# Patient Record
Sex: MALE | Race: WHITE | NOT HISPANIC OR LATINO | Employment: UNEMPLOYED | URBAN - METROPOLITAN AREA
[De-identification: names, ages, dates, MRNs, and addresses within clinical notes are randomized per-mention and may not be internally consistent; named-entity substitution may affect disease eponyms.]

---

## 2019-11-20 ENCOUNTER — HOSPITAL ENCOUNTER (OUTPATIENT)
Facility: MEDICAL CENTER | Age: 49
End: 2019-11-22
Attending: EMERGENCY MEDICINE | Admitting: INTERNAL MEDICINE
Payer: COMMERCIAL

## 2019-11-20 ENCOUNTER — HOSPITAL ENCOUNTER (OUTPATIENT)
Facility: MEDICAL CENTER | Age: 49
End: 2019-11-20

## 2019-11-20 DIAGNOSIS — R79.89 ELEVATED TROPONIN: ICD-10-CM

## 2019-11-20 DIAGNOSIS — I25.9 CHEST PAIN DUE TO MYOCARDIAL ISCHEMIA, UNSPECIFIED ISCHEMIC CHEST PAIN TYPE: ICD-10-CM

## 2019-11-20 LAB
EKG IMPRESSION: NORMAL
TROPONIN T SERPL-MCNC: 32 NG/L (ref 6–19)

## 2019-11-20 PROCEDURE — 99285 EMERGENCY DEPT VISIT HI MDM: CPT

## 2019-11-20 PROCEDURE — 36415 COLL VENOUS BLD VENIPUNCTURE: CPT

## 2019-11-20 PROCEDURE — 84484 ASSAY OF TROPONIN QUANT: CPT

## 2019-11-20 PROCEDURE — 94760 N-INVAS EAR/PLS OXIMETRY 1: CPT

## 2019-11-20 PROCEDURE — 93005 ELECTROCARDIOGRAM TRACING: CPT

## 2019-11-20 PROCEDURE — 93005 ELECTROCARDIOGRAM TRACING: CPT | Performed by: EMERGENCY MEDICINE

## 2019-11-21 ENCOUNTER — APPOINTMENT (OUTPATIENT)
Dept: CARDIOLOGY | Facility: MEDICAL CENTER | Age: 49
End: 2019-11-21
Attending: STUDENT IN AN ORGANIZED HEALTH CARE EDUCATION/TRAINING PROGRAM
Payer: COMMERCIAL

## 2019-11-21 PROBLEM — R06.02 SHORTNESS OF BREATH: Status: ACTIVE | Noted: 2019-11-21

## 2019-11-21 PROBLEM — R07.9 CHEST PAIN: Status: ACTIVE | Noted: 2019-11-21

## 2019-11-21 PROBLEM — I50.9 CONGESTIVE HEART FAILURE (CHF) (HCC): Status: ACTIVE | Noted: 2019-11-21

## 2019-11-21 LAB
ALBUMIN SERPL BCP-MCNC: 4.1 G/DL (ref 3.2–4.9)
ALBUMIN/GLOB SERPL: 1.5 G/DL
ALP SERPL-CCNC: 110 U/L (ref 30–99)
ALT SERPL-CCNC: 22 U/L (ref 2–50)
AMPHET UR QL SCN: NEGATIVE
ANION GAP SERPL CALC-SCNC: 9 MMOL/L (ref 0–11.9)
AST SERPL-CCNC: 17 U/L (ref 12–45)
BARBITURATES UR QL SCN: NEGATIVE
BASOPHILS # BLD AUTO: 0.5 % (ref 0–1.8)
BASOPHILS # BLD: 0.05 K/UL (ref 0–0.12)
BENZODIAZ UR QL SCN: NEGATIVE
BILIRUB SERPL-MCNC: 0.5 MG/DL (ref 0.1–1.5)
BUN SERPL-MCNC: 15 MG/DL (ref 8–22)
BZE UR QL SCN: NEGATIVE
CALCIUM SERPL-MCNC: 8.8 MG/DL (ref 8.5–10.5)
CANNABINOIDS UR QL SCN: NEGATIVE
CHLORIDE SERPL-SCNC: 104 MMOL/L (ref 96–112)
CHOLEST SERPL-MCNC: 136 MG/DL (ref 100–199)
CO2 SERPL-SCNC: 24 MMOL/L (ref 20–33)
CREAT SERPL-MCNC: 0.98 MG/DL (ref 0.5–1.4)
EKG IMPRESSION: NORMAL
EKG IMPRESSION: NORMAL
EOSINOPHIL # BLD AUTO: 0.13 K/UL (ref 0–0.51)
EOSINOPHIL NFR BLD: 1.3 % (ref 0–6.9)
ERYTHROCYTE [DISTWIDTH] IN BLOOD BY AUTOMATED COUNT: 45.9 FL (ref 35.9–50)
GLOBULIN SER CALC-MCNC: 2.8 G/DL (ref 1.9–3.5)
GLUCOSE SERPL-MCNC: 127 MG/DL (ref 65–99)
HCT VFR BLD AUTO: 51.8 % (ref 42–52)
HDLC SERPL-MCNC: 42 MG/DL
HGB BLD-MCNC: 17.2 G/DL (ref 14–18)
IMM GRANULOCYTES # BLD AUTO: 0.05 K/UL (ref 0–0.11)
IMM GRANULOCYTES NFR BLD AUTO: 0.5 % (ref 0–0.9)
LDLC SERPL CALC-MCNC: 70 MG/DL
LV EJECT FRACT  99904: 20
LV EJECT FRACT MOD 2C 99903: 22.26
LV EJECT FRACT MOD 4C 99902: 29.19
LV EJECT FRACT MOD BP 99901: 26.2
LYMPHOCYTES # BLD AUTO: 3.05 K/UL (ref 1–4.8)
LYMPHOCYTES NFR BLD: 29.5 % (ref 22–41)
MAGNESIUM SERPL-MCNC: 1.8 MG/DL (ref 1.5–2.5)
MCH RBC QN AUTO: 30.2 PG (ref 27–33)
MCHC RBC AUTO-ENTMCNC: 33.2 G/DL (ref 33.7–35.3)
MCV RBC AUTO: 90.9 FL (ref 81.4–97.8)
METHADONE UR QL SCN: NEGATIVE
MONOCYTES # BLD AUTO: 0.78 K/UL (ref 0–0.85)
MONOCYTES NFR BLD AUTO: 7.5 % (ref 0–13.4)
NEUTROPHILS # BLD AUTO: 6.28 K/UL (ref 1.82–7.42)
NEUTROPHILS NFR BLD: 60.7 % (ref 44–72)
NRBC # BLD AUTO: 0 K/UL
NRBC BLD-RTO: 0 /100 WBC
OPIATES UR QL SCN: NEGATIVE
OXYCODONE UR QL SCN: POSITIVE
PCP UR QL SCN: NEGATIVE
PLATELET # BLD AUTO: 289 K/UL (ref 164–446)
PMV BLD AUTO: 9.7 FL (ref 9–12.9)
POTASSIUM SERPL-SCNC: 3.8 MMOL/L (ref 3.6–5.5)
PROPOXYPH UR QL SCN: NEGATIVE
PROT SERPL-MCNC: 6.9 G/DL (ref 6–8.2)
RBC # BLD AUTO: 5.7 M/UL (ref 4.7–6.1)
SODIUM SERPL-SCNC: 137 MMOL/L (ref 135–145)
TRIGL SERPL-MCNC: 122 MG/DL (ref 0–149)
TROPONIN T SERPL-MCNC: 33 NG/L (ref 6–19)
WBC # BLD AUTO: 10.3 K/UL (ref 4.8–10.8)

## 2019-11-21 PROCEDURE — 96372 THER/PROPH/DIAG INJ SC/IM: CPT

## 2019-11-21 PROCEDURE — 96366 THER/PROPH/DIAG IV INF ADDON: CPT

## 2019-11-21 PROCEDURE — 80053 COMPREHEN METABOLIC PANEL: CPT

## 2019-11-21 PROCEDURE — 93005 ELECTROCARDIOGRAM TRACING: CPT

## 2019-11-21 PROCEDURE — 80061 LIPID PANEL: CPT

## 2019-11-21 PROCEDURE — 700102 HCHG RX REV CODE 250 W/ 637 OVERRIDE(OP): Performed by: STUDENT IN AN ORGANIZED HEALTH CARE EDUCATION/TRAINING PROGRAM

## 2019-11-21 PROCEDURE — A9270 NON-COVERED ITEM OR SERVICE: HCPCS | Performed by: STUDENT IN AN ORGANIZED HEALTH CARE EDUCATION/TRAINING PROGRAM

## 2019-11-21 PROCEDURE — 36415 COLL VENOUS BLD VENIPUNCTURE: CPT

## 2019-11-21 PROCEDURE — 700111 HCHG RX REV CODE 636 W/ 250 OVERRIDE (IP): Performed by: STUDENT IN AN ORGANIZED HEALTH CARE EDUCATION/TRAINING PROGRAM

## 2019-11-21 PROCEDURE — 96375 TX/PRO/DX INJ NEW DRUG ADDON: CPT

## 2019-11-21 PROCEDURE — G0378 HOSPITAL OBSERVATION PER HR: HCPCS

## 2019-11-21 PROCEDURE — 85025 COMPLETE CBC W/AUTO DIFF WBC: CPT

## 2019-11-21 PROCEDURE — 700102 HCHG RX REV CODE 250 W/ 637 OVERRIDE(OP): Performed by: EMERGENCY MEDICINE

## 2019-11-21 PROCEDURE — 83735 ASSAY OF MAGNESIUM: CPT

## 2019-11-21 PROCEDURE — 700117 HCHG RX CONTRAST REV CODE 255: Performed by: STUDENT IN AN ORGANIZED HEALTH CARE EDUCATION/TRAINING PROGRAM

## 2019-11-21 PROCEDURE — 80307 DRUG TEST PRSMV CHEM ANLYZR: CPT

## 2019-11-21 PROCEDURE — 84484 ASSAY OF TROPONIN QUANT: CPT

## 2019-11-21 PROCEDURE — 93306 TTE W/DOPPLER COMPLETE: CPT

## 2019-11-21 PROCEDURE — 93005 ELECTROCARDIOGRAM TRACING: CPT | Performed by: STUDENT IN AN ORGANIZED HEALTH CARE EDUCATION/TRAINING PROGRAM

## 2019-11-21 PROCEDURE — 93306 TTE W/DOPPLER COMPLETE: CPT | Mod: 26 | Performed by: INTERNAL MEDICINE

## 2019-11-21 PROCEDURE — 93010 ELECTROCARDIOGRAM REPORT: CPT | Performed by: INTERNAL MEDICINE

## 2019-11-21 PROCEDURE — 99220 PR INITIAL OBSERVATION CARE,LEVL III: CPT | Mod: GC | Performed by: INTERNAL MEDICINE

## 2019-11-21 PROCEDURE — A9270 NON-COVERED ITEM OR SERVICE: HCPCS | Performed by: EMERGENCY MEDICINE

## 2019-11-21 PROCEDURE — 96365 THER/PROPH/DIAG IV INF INIT: CPT

## 2019-11-21 RX ORDER — GUAIFENESIN 600 MG/1
600 TABLET, EXTENDED RELEASE ORAL EVERY 12 HOURS
Status: DISCONTINUED | OUTPATIENT
Start: 2019-11-21 | End: 2019-11-21

## 2019-11-21 RX ORDER — LISINOPRIL 10 MG/1
10 TABLET ORAL
Status: DISCONTINUED | OUTPATIENT
Start: 2019-11-21 | End: 2019-11-22 | Stop reason: HOSPADM

## 2019-11-21 RX ORDER — CARVEDILOL 6.25 MG/1
6.25 TABLET ORAL 2 TIMES DAILY WITH MEALS
Status: ON HOLD | COMMUNITY
Start: 2018-12-17 | End: 2019-11-22

## 2019-11-21 RX ORDER — LISINOPRIL 10 MG/1
10 TABLET ORAL DAILY
COMMUNITY
Start: 2018-12-17 | End: 2019-12-10 | Stop reason: SDUPTHER

## 2019-11-21 RX ORDER — FUROSEMIDE 10 MG/ML
40 INJECTION INTRAMUSCULAR; INTRAVENOUS
Status: DISCONTINUED | OUTPATIENT
Start: 2019-11-22 | End: 2019-11-22 | Stop reason: HOSPADM

## 2019-11-21 RX ORDER — FUROSEMIDE 10 MG/ML
20 INJECTION INTRAMUSCULAR; INTRAVENOUS ONCE
Status: COMPLETED | OUTPATIENT
Start: 2019-11-21 | End: 2019-11-21

## 2019-11-21 RX ORDER — AMOXICILLIN 250 MG
2 CAPSULE ORAL 2 TIMES DAILY
Status: DISCONTINUED | OUTPATIENT
Start: 2019-11-21 | End: 2019-11-22 | Stop reason: HOSPADM

## 2019-11-21 RX ORDER — ASPIRIN 81 MG/1
81 TABLET, CHEWABLE ORAL DAILY
COMMUNITY
Start: 2018-09-25

## 2019-11-21 RX ORDER — SPIRONOLACTONE 25 MG/1
25 TABLET ORAL
Status: DISCONTINUED | OUTPATIENT
Start: 2019-11-21 | End: 2019-11-22 | Stop reason: HOSPADM

## 2019-11-21 RX ORDER — POTASSIUM CHLORIDE 20 MEQ/1
40 TABLET, EXTENDED RELEASE ORAL ONCE
Status: COMPLETED | OUTPATIENT
Start: 2019-11-21 | End: 2019-11-21

## 2019-11-21 RX ORDER — FUROSEMIDE 40 MG/1
40 TABLET ORAL
Status: DISCONTINUED | OUTPATIENT
Start: 2019-11-21 | End: 2019-11-21

## 2019-11-21 RX ORDER — ATORVASTATIN CALCIUM 80 MG/1
80 TABLET, FILM COATED ORAL EVERY EVENING
Status: ON HOLD | COMMUNITY
Start: 2018-12-17 | End: 2019-11-22

## 2019-11-21 RX ORDER — ATORVASTATIN CALCIUM 40 MG/1
40 TABLET, FILM COATED ORAL EVERY EVENING
Status: DISCONTINUED | OUTPATIENT
Start: 2019-11-21 | End: 2019-11-22 | Stop reason: HOSPADM

## 2019-11-21 RX ORDER — BISACODYL 10 MG
10 SUPPOSITORY, RECTAL RECTAL
Status: DISCONTINUED | OUTPATIENT
Start: 2019-11-21 | End: 2019-11-22 | Stop reason: HOSPADM

## 2019-11-21 RX ORDER — SPIRONOLACTONE 25 MG/1
25 TABLET ORAL DAILY
COMMUNITY
Start: 2018-12-17 | End: 2020-01-23 | Stop reason: SDUPTHER

## 2019-11-21 RX ORDER — POLYETHYLENE GLYCOL 3350 17 G/17G
1 POWDER, FOR SOLUTION ORAL
Status: DISCONTINUED | OUTPATIENT
Start: 2019-11-21 | End: 2019-11-22 | Stop reason: HOSPADM

## 2019-11-21 RX ORDER — CARVEDILOL 12.5 MG/1
12.5 TABLET ORAL 2 TIMES DAILY WITH MEALS
Status: DISCONTINUED | OUTPATIENT
Start: 2019-11-21 | End: 2019-11-22 | Stop reason: HOSPADM

## 2019-11-21 RX ORDER — POTASSIUM CHLORIDE 1500 MG/1
40 TABLET, EXTENDED RELEASE ORAL DAILY
Status: ON HOLD | COMMUNITY
Start: 2018-12-17 | End: 2019-11-22

## 2019-11-21 RX ORDER — MAGNESIUM SULFATE HEPTAHYDRATE 40 MG/ML
2 INJECTION, SOLUTION INTRAVENOUS ONCE
Status: COMPLETED | OUTPATIENT
Start: 2019-11-21 | End: 2019-11-21

## 2019-11-21 RX ORDER — CLOPIDOGREL BISULFATE 75 MG/1
75 TABLET ORAL DAILY
Status: ON HOLD | COMMUNITY
Start: 2018-12-17 | End: 2019-12-17

## 2019-11-21 RX ORDER — NICOTINE 21 MG/24HR
1 PATCH, TRANSDERMAL 24 HOURS TRANSDERMAL ONCE
Status: COMPLETED | OUTPATIENT
Start: 2019-11-21 | End: 2019-11-21

## 2019-11-21 RX ADMIN — CARVEDILOL 12.5 MG: 12.5 TABLET, FILM COATED ORAL at 17:58

## 2019-11-21 RX ADMIN — FUROSEMIDE 40 MG: 40 TABLET ORAL at 01:26

## 2019-11-21 RX ADMIN — NICOTINE 14 MG: 14 PATCH TRANSDERMAL at 01:26

## 2019-11-21 RX ADMIN — GUAIFENESIN 600 MG: 600 TABLET, EXTENDED RELEASE ORAL at 17:57

## 2019-11-21 RX ADMIN — CARVEDILOL 12.5 MG: 12.5 TABLET, FILM COATED ORAL at 08:03

## 2019-11-21 RX ADMIN — FUROSEMIDE 20 MG: 10 INJECTION, SOLUTION INTRAMUSCULAR; INTRAVENOUS at 12:26

## 2019-11-21 RX ADMIN — POTASSIUM CHLORIDE 40 MEQ: 1500 TABLET, EXTENDED RELEASE ORAL at 02:34

## 2019-11-21 RX ADMIN — ATORVASTATIN CALCIUM 40 MG: 40 TABLET, FILM COATED ORAL at 17:58

## 2019-11-21 RX ADMIN — SPIRONOLACTONE 25 MG: 25 TABLET ORAL at 05:03

## 2019-11-21 RX ADMIN — GUAIFENESIN 200 MG: 100 SOLUTION ORAL at 20:43

## 2019-11-21 RX ADMIN — GUAIFENESIN 600 MG: 600 TABLET, EXTENDED RELEASE ORAL at 08:02

## 2019-11-21 RX ADMIN — ENOXAPARIN SODIUM 40 MG: 100 INJECTION SUBCUTANEOUS at 05:03

## 2019-11-21 RX ADMIN — LISINOPRIL 10 MG: 10 TABLET ORAL at 05:03

## 2019-11-21 RX ADMIN — ATORVASTATIN CALCIUM 40 MG: 40 TABLET, FILM COATED ORAL at 02:34

## 2019-11-21 RX ADMIN — ASPIRIN 81 MG: 81 TABLET, COATED ORAL at 01:26

## 2019-11-21 RX ADMIN — MAGNESIUM SULFATE IN WATER 2 G: 40 INJECTION, SOLUTION INTRAVENOUS at 05:03

## 2019-11-21 RX ADMIN — HUMAN ALBUMIN MICROSPHERES AND PERFLUTREN 3 ML: 10; .22 INJECTION, SOLUTION INTRAVENOUS at 10:38

## 2019-11-21 ASSESSMENT — COGNITIVE AND FUNCTIONAL STATUS - GENERAL
SUGGESTED CMS G CODE MODIFIER MOBILITY: CH
DAILY ACTIVITIY SCORE: 24
MOBILITY SCORE: 24
SUGGESTED CMS G CODE MODIFIER DAILY ACTIVITY: CH

## 2019-11-21 ASSESSMENT — ENCOUNTER SYMPTOMS
TREMORS: 0
VOMITING: 0
NECK PAIN: 0
TINGLING: 0
DOUBLE VISION: 0
ORTHOPNEA: 1
PND: 1
WHEEZING: 0
PALPITATIONS: 0
BLURRED VISION: 0
COUGH: 1
ABDOMINAL PAIN: 0
WEIGHT LOSS: 0
DIZZINESS: 0
HEMOPTYSIS: 0
FEVER: 0
HEARTBURN: 0
DIARRHEA: 0
BRUISES/BLEEDS EASILY: 0
NAUSEA: 0
SPUTUM PRODUCTION: 0
HEADACHES: 0
PHOTOPHOBIA: 0
SHORTNESS OF BREATH: 1
CLAUDICATION: 0
MYALGIAS: 0
BACK PAIN: 0
CHILLS: 0
DEPRESSION: 0
HALLUCINATIONS: 0

## 2019-11-21 ASSESSMENT — LIFESTYLE VARIABLES
ALCOHOL_USE: NO
EVER HAD A DRINK FIRST THING IN THE MORNING TO STEADY YOUR NERVES TO GET RID OF A HANGOVER: NO
DOES PATIENT WANT TO STOP DRINKING: NO
TOTAL SCORE: 0
CONSUMPTION TOTAL: NEGATIVE
HAVE YOU EVER FELT YOU SHOULD CUT DOWN ON YOUR DRINKING: NO
EVER FELT BAD OR GUILTY ABOUT YOUR DRINKING: NO
SUBSTANCE_ABUSE: 0
HOW MANY TIMES IN THE PAST YEAR HAVE YOU HAD 5 OR MORE DRINKS IN A DAY: 0
TOTAL SCORE: 0
EVER_SMOKED: YES
HAVE PEOPLE ANNOYED YOU BY CRITICIZING YOUR DRINKING: NO
TOTAL SCORE: 0
AVERAGE NUMBER OF DAYS PER WEEK YOU HAVE A DRINK CONTAINING ALCOHOL: 0
ON A TYPICAL DAY WHEN YOU DRINK ALCOHOL HOW MANY DRINKS DO YOU HAVE: 0

## 2019-11-21 ASSESSMENT — COPD QUESTIONNAIRES
IN THE PAST 12 MONTHS DO YOU DO LESS THAN YOU USED TO BECAUSE OF YOUR BREATHING PROBLEMS: DISAGREE/UNSURE
DURING THE PAST 4 WEEKS HOW MUCH DID YOU FEEL SHORT OF BREATH: MOST  OR ALL OF THE TIME
HAVE YOU SMOKED AT LEAST 100 CIGARETTES IN YOUR ENTIRE LIFE: YES
COPD SCREENING SCORE: 5
DO YOU EVER COUGH UP ANY MUCUS OR PHLEGM?: NO/ONLY WITH OCCASIONAL COLDS OR INFECTIONS

## 2019-11-21 ASSESSMENT — PATIENT HEALTH QUESTIONNAIRE - PHQ9
1. LITTLE INTEREST OR PLEASURE IN DOING THINGS: NOT AT ALL
2. FEELING DOWN, DEPRESSED, IRRITABLE, OR HOPELESS: NOT AT ALL
SUM OF ALL RESPONSES TO PHQ9 QUESTIONS 1 AND 2: 0

## 2019-11-21 NOTE — CARE PLAN
Problem: Communication  Goal: The ability to communicate needs accurately and effectively will improve  Outcome: PROGRESSING AS EXPECTED     Problem: Safety  Goal: Will remain free from injury  Outcome: PROGRESSING AS EXPECTED  Goal: Will remain free from falls  Outcome: PROGRESSING AS EXPECTED     Problem: Infection  Goal: Will remain free from infection  Outcome: PROGRESSING AS EXPECTED     Problem: Venous Thromboembolism (VTW)/Deep Vein Thrombosis (DVT) Prevention:  Goal: Patient will participate in Venous Thrombosis (VTE)/Deep Vein Thrombosis (DVT)Prevention Measures  Outcome: PROGRESSING AS EXPECTED     Problem: Bowel/Gastric:  Goal: Normal bowel function is maintained or improved  Outcome: PROGRESSING AS EXPECTED  Goal: Will not experience complications related to bowel motility  Outcome: PROGRESSING AS EXPECTED     Problem: Knowledge Deficit  Goal: Knowledge of disease process/condition, treatment plan, diagnostic tests, and medications will improve  Outcome: PROGRESSING AS EXPECTED  Goal: Knowledge of the prescribed therapeutic regimen will improve  Outcome: PROGRESSING AS EXPECTED     Problem: Discharge Barriers/Planning  Goal: Patient's continuum of care needs will be met  Outcome: PROGRESSING AS EXPECTED     Problem: Fluid Volume:  Goal: Will maintain balanced intake and output  Outcome: PROGRESSING AS EXPECTED     Problem: Respiratory:  Goal: Respiratory status will improve  Outcome: PROGRESSING AS EXPECTED    No changes from previous assessment. Cath report from Graciela in bedside chart. MD aware. Plan for echo at this time/later today. No c/o of CP. VSS. Tele: GORDON

## 2019-11-21 NOTE — PROGRESS NOTES
Received report from flex RN. Assumed care at 0218. This pt is AOx4, ambulatory with SBA, reports no pain. Monitor room notified, SR. ABHIS

## 2019-11-21 NOTE — ASSESSMENT & PLAN NOTE
Prior history of CAD s/p PCI  Troponin T - 32 ->33  EKG shows T wave inversions in V4-V6 & AVL, similar to previous EKG findings.  - Telemetry  -Chest pain improved since hospitalization.

## 2019-11-21 NOTE — ED PROVIDER NOTES
"ED Provider Note    Scribed for Luis E Ballesteros M.D. by Sage Sage. 11/20/2019, 10:16 PM.    Primary care provider: Avi Harrell M.D. (Inactive)  Means of arrival: Ambulance  History obtained from: Patient and Transfer Report  History limited by: None    CHIEF COMPLAINT  Chief Complaint   Patient presents with   • Chest Pain   • Cough       HPI  Isiah Resendez is a 49 y.o. male with extensive cardiovascular history, including CHF and CAD, who [facility] pain, shortness of breath, and cough onset about one week ago. Patient reports that he was diagnosed with CHF and had stents placed in February of this year. Since then, the patient has been having worsening shortness of breath and has been hospitalized three more times in [place] since having the stents placed. He states that his shortness of breath is exacerbated seemingly random as sometimes he cannot breathe even when watching television at home. The patient reports [facility] because he has had three or four episodes of sharp chest pain over the last few days. He describes the pressure as a \"squeezing sensation\", and will usually go away if he relaxes. He also notes that he has been having a cold recently which has caused him to have a cough. This cough now exacerbates his shortness of breath to a point that causes him to be anxious, and he would be lightheaded after the coughing fit. [facility], but was eventually transferred here at [facility] due to EKG changes and for higher level of care. At presentation, the patient endorses having to sit up more frequently lately in order to breathe easier. He denies any lower extremity swelling as he is currently taking Lasix. Patient has no history of blood clots, and denies any alcohol or drug use.    REVIEW OF SYSTEMS  Pertinent positives include chest pain, shortness of breath, cough, " "lightheadedness, and anxious. Pertinent negatives include no lower extremity swelling. All other systems negative.    PAST MEDICAL HISTORY   has a past medical history of Chronic pain, Cold (1/1/15), HTN (hypertension), Renal disorder, and Urinary bladder disorder.    SURGICAL HISTORY   has a past surgical history that includes open reduction; other; other orthopedic surgery (2001 ); tonsillectomy and adenoidectomy; lithotripsy (2009); cystoscopy (1/13/2015); and lasertripsy (1/13/2015).    SOCIAL HISTORY  Social History     Tobacco Use   • Smoking status: Current Every Day Smoker     Packs/day: 1.00     Years: 25.00     Pack years: 25.00     Types: Cigarettes   • Smokeless tobacco: Never Used   Substance Use Topics   • Alcohol use: Yes     Comment: 1-2 beers/month   • Drug use: No     Comment: experimented drugs in [place]      Social History     Substance and Sexual Activity   Drug Use No    Comment: experimented drugs in [place]       FAMILY HISTORY  Family History   Problem Relation Age of Onset   • Other Mother         post polio   • Hypertension Mother    • Hyperlipidemia Mother    • Hyperlipidemia Father        CURRENT MEDICATIONS  Home Medications     Reviewed by María Shannon R.N. (Registered Nurse) on 11/20/19 at 2128  Med List Status: Partial   Medication Last Dose Status   lisinopril (PRINIVIL, ZESTRIL) 40 MG tablet  Active                ALLERGIES  No Known Allergies    PHYSICAL EXAM  VITAL SIGNS: /103   Pulse 80   Temp 36.8 °C (98.2 °F) (Temporal)   Resp 19   Ht 1.88 m (6' 2\")   Wt 88 kg (194 lb)   SpO2 94%   BMI 24.91 kg/m²     Constitutional: Well developed, Well nourished, mild distress.   HENT: Normocephalic, Atraumatic, Oropharynx moist.   Eyes: Conjunctiva normal, No discharge.   Neck: Supple, No stridor   Cardiovascular: Normal heart rate, Normal rhythm, No murmurs, equal pulses.   Pulmonary: Normal breath sounds, No respiratory distress, No wheezing, No rales, No rhonchi.  Chest: No " chest wall tenderness or deformity.   Abdomen: Obese. Soft, No tenderness, No masses, no rebound, no guarding.   Back: No CVA tenderness.   Musculoskeletal: No major deformities noted, No tenderness. No edema in lower extremities. No calf tenderness.  Skin: Warm, Dry, No erythema, No rash.   Neurologic: Alert & oriented x 3, Normal motor function,  No focal deficits noted.   Psychiatric: Affect normal, Judgment normal, Mood normal.     LABS  Results for orders placed or performed during the hospital encounter of 11/20/19   Troponin STAT   Result Value Ref Range    Troponin T 32 (H) 6 - 19 ng/L   Comp Metabolic Panel   Result Value Ref Range    Sodium 137 135 - 145 mmol/L    Potassium 3.8 3.6 - 5.5 mmol/L    Chloride 104 96 - 112 mmol/L    Co2 24 20 - 33 mmol/L    Anion Gap 9.0 0.0 - 11.9    Glucose 127 (H) 65 - 99 mg/dL    Bun 15 8 - 22 mg/dL    Creatinine 0.98 0.50 - 1.40 mg/dL    Calcium 8.8 8.5 - 10.5 mg/dL    AST(SGOT) 17 12 - 45 U/L    ALT(SGPT) 22 2 - 50 U/L    Alkaline Phosphatase 110 (H) 30 - 99 U/L    Total Bilirubin 0.5 0.1 - 1.5 mg/dL    Albumin 4.1 3.2 - 4.9 g/dL    Total Protein 6.9 6.0 - 8.2 g/dL    Globulin 2.8 1.9 - 3.5 g/dL    A-G Ratio 1.5 g/dL   CBC WITH DIFFERENTIAL   Result Value Ref Range    WBC 10.3 4.8 - 10.8 K/uL    RBC 5.70 4.70 - 6.10 M/uL    Hemoglobin 17.2 14.0 - 18.0 g/dL    Hematocrit 51.8 42.0 - 52.0 %    MCV 90.9 81.4 - 97.8 fL    MCH 30.2 27.0 - 33.0 pg    MCHC 33.2 (L) 33.7 - 35.3 g/dL    RDW 45.9 35.9 - 50.0 fL    Platelet Count 289 164 - 446 K/uL    MPV 9.7 9.0 - 12.9 fL    Neutrophils-Polys 60.70 44.00 - 72.00 %    Lymphocytes 29.50 22.00 - 41.00 %    Monocytes 7.50 0.00 - 13.40 %    Eosinophils 1.30 0.00 - 6.90 %    Basophils 0.50 0.00 - 1.80 %    Immature Granulocytes 0.50 0.00 - 0.90 %    Nucleated RBC 0.00 /100 WBC    Neutrophils (Absolute) 6.28 1.82 - 7.42 K/uL    Lymphs (Absolute) 3.05 1.00 - 4.80 K/uL    Monos (Absolute) 0.78 0.00 - 0.85 K/uL    Eos (Absolute) 0.13 0.00 -  0.51 K/uL    Baso (Absolute) 0.05 0.00 - 0.12 K/uL    Immature Granulocytes (abs) 0.05 0.00 - 0.11 K/uL    NRBC (Absolute) 0.00 K/uL   MAGNESIUM   Result Value Ref Range    Magnesium 1.8 1.5 - 2.5 mg/dL   ESTIMATED GFR   Result Value Ref Range    GFR If African American >60 >60 mL/min/1.73 m 2    GFR If Non African American >60 >60 mL/min/1.73 m 2   EKG   Result Value Ref Range    Report       [facility] Emergency Dept.    Test Date:  2019  Pt Name:    LINNEA WILLINGHAM                 Department: ER  MRN:        [id number]                      Room:       RD 01  Gender:     Male                         Technician: [id number]  :        1970                   Requested By:ER TRIAGE PROTOCOL  Order #:    [id number]                    Reading MD: GINA GARCIA MD    Measurements  Intervals                                Axis  Rate:       80                           P:          7  CO:         176                          QRS:        -73  QRSD:       98                           T:          102  QT:         424  QTc:        490    Interpretive Statements  SINUS RHYTHM  PROBABLE LEFT ATRIAL ABNORMALITY  PROBABLE INFERIOR INFARCT, OLD  LATERAL LEADS ARE ALSO INVOLVED  BORDERLINE PROLONGED QT INTERVAL  No previous ECG available for comparison  Compared to outside EKG, St depression less.  Electronically Signed On 2019 23:39:47 PST by GINA ONEAL MD       All labs reviewed by me.    EKG  12 Lead EKG interpreted by me as shown above.    COURSE & MEDICAL DECISION MAKING  Pertinent Labs & Imaging studies reviewed. (See chart for details)    10:05 PM - Obtained and reviewed past medical records which indicated the patient had CHF and CAD with stenting in February of this year. He also had an ejection fraction of 25% then. [facility] Nitrogen of 15, Creatinine of 1.0, Alkaline Phosphatase 114, AST 15, T. Bilirubin 0.3, and Magnesium 1.8. Initial  troponin taken there was 0.07 and INR of 1.2. Patient had sodium levels of 141, Potassium of 3.6, Chloride 105, CO2 of 25, and Glucose 130. His Rapid Flu A was negative. Other lab findings showed B Type Natriuretic Peptide of 415, WBC 9.83, HGB 18.1, HCG 53.1, and PLT of 317. Chest x-ray show subsegmental atelectasis versus scar in the lower left lung. The cardiac silhouette is normal. There is unchanged tortuosity of the thoracic aorta. The pulmonary vascular is normal. There is no plural effusion. There is no pneumothorax.     9:25 PM - Ordered EKG to evaluate.    10:16 PM - Patient seen and examined at bedside. Ordered Troponin STAT and EKG to evaluate his symptoms. Discussed with the patient my plan to order repeat troponin and hospitalize him for further care and evaluation. Patient agrees to hospitalization and plan of care. The differential diagnoses include but are not limited to: ACS, Myocardial infarction, NSTEMI, and CHF.     11:34 PM - I discussed the patient's case and the above findings with Dr. Mosqueda ([facility]) who will evaluate the patient for hospitalization.      Medical Decision Making: At this point time patient does have an elevated troponin with chest pain and history of significant coronary artery disease therefore I think he would benefit from further risk stratification with repeat enzymes and possible stress test.  Patient's lung exam is otherwise clear I do not think this is CHF he does not look volume overloaded.  Pain is not ripping or tearing does not go to his back I do not think this is aortic dissection.  Patient is not hypoxic no signs of DVT I do not think a d-dimer would be helpful.    DISPOSITION:  Patient will be hospitalized by Dr. Mosqueda ([facility] [place] [facility]) in guarded condition.     FINAL IMPRESSION  1. Chest pain due to myocardial ischemia, unspecified ischemic chest pain type    2. Elevated troponin          I, Sage Sage (Scribe), am scribing for, and in the  presence of, Luis E Ballesteros M.D.    Electronically signed by: Sage Sage (Scribe), 11/20/2019    ILuis E M.D. personally performed the services described in this documentation, as scribed by Sage Sage in my presence, and it is both accurate and complete.    C    The note accurately reflects work and decisions made by me.  Luis E Ballesteros  11/21/2019  1:38 AM

## 2019-11-21 NOTE — SENIOR ADMIT NOTE
Senior Admit Note      This is a 49 years old male, recently moved from [place], his past medical is history significant for CAD s/p PCI x 3 [place] to dissected RCA (2/2018) at [facility] (was following Dr. Thomas - now wants to establish care in [facility], hasn't seen anyone yet), Ischaemic cardiomyopathy (EF improved from 15% to 30 % in a month after MI, then repeat echo in 05/2018 was 59% - didn't get ICD), h/o Meth abuse - [facility] Acute Coronary syndrome.       Patient has been having chest pain for last 2 days, intermittent, starts as a sharp pain then feels like pressure (someone sitting on his chest), not exertional pattern but relieves with taking rest/relaxing, no radiation, each episode lasts few minutes, not associated with nausea vomiting/sweating.  He is also having progressively worsening shortness of breath, orthopnea, PND for the last few weeks.  Especially for last couple of days he is having ' coughing fits' without any productive sputum followed by shortness of breath.  Denies any fever, chills, palpitation, leg swelling, weight gain.      Labs from ER significant for troponin I 0.07, creatinine 1.0, . CXR showed mild left lower base atelectasis. He was transferred to [facility] for Elevated troponin and EKG changes (I couldn't appreciate any acute ischemic changes). Troponin T in [facility] ER is 32, EKG shows T wave inversion in V4-6 and AVL, which is similar to his EKG from 10//16/[facility].       Physical Exam   Constitutional:   Elderly male lying comfortably in bed, in no acute distress, able to complete full sentence   Neck: No JVD present.   Cardiovascular: Normal rate, regular rhythm, normal heart sounds and intact distal pulses.   Pulmonary/Chest: Effort normal and breath sounds normal. No respiratory distress. He has no wheezes. He has no rales. He exhibits no  tenderness.   Musculoskeletal:         General: No edema.         Assessment and Plan:  Stable Angina:  CHF:  CAD:  Extensive cardiac history, comes with chest pain and worsening SOB. On physical exam euvolumic. Hemodynamically stable. On RA.   - EKG findings of lateral ischemia which is stable when compared with prior, 2 troponin ([facility]) has been insignificant for ACS , will trend 1 more time.  - If neg, stress test in the AM, NPO at midnight    - I don't think he is in acute exacerbation of heart failure,  in [facility]. But given his chronic progressing symptoms his heart function might have worsened. Day team to consider echocardiogram in the AM  - Resumed all home meds including PO Lasix and aldactone, Aspirin was not on [facility] med list. Added 81 mg daily   - Replete K and Mg, goal K >4, Mg >2    For full H&P see Dr. Cool's note

## 2019-11-21 NOTE — ED TRIAGE NOTES
[facility]. Pt presented with complaints of cough since Tuesday or Wednesday with the addition of CP over the last few days. [facility] stated that chest pain had resolved. [facility] obtained [facility] and noted possible T wave depression, Troponin resulted at 0.6. Pt has hx of hypertension CHF, and cardiomyopathy. Pt states he is compliant with his medications. Pt is still a current everyday smoker. Pt denies chest pain at this time but reports SOB with activity

## 2019-11-21 NOTE — PROGRESS NOTES
Received report from ED RN. Pt arrived to unit at 0215 via gurney escorted by ACLS RN. Assessment complete. VSS. No signs of distress noted at this time. Tele monitor in place. Monitor room notified. Pt c/o pain 0/10. Fall precautions and appropriate signs in place. Pt oriented to unit routine, call light/phone system within reach. Personal belongings within reach. RN extension number provided. Pt educated regarding fall precautions. Bed alarm is on. No isolation precautions in place. Pt denies any further needs at this time.

## 2019-11-21 NOTE — ED PROVIDER NOTES
Realease of info faxed to [facility] in [facility] per MD regarding previous cardiac catheterization report.

## 2019-11-21 NOTE — PROGRESS NOTES
2 RN skin assessment complete with Manda RN    Feet dry and flaky bilateral  No other areas of concern noted at this time skin clean dry and intact

## 2019-11-21 NOTE — PROGRESS NOTES
Pt up to bathroom this morning. Pt UAL, tolerates well. Pt with no complaints of CP. Plan for echo possible stress test. NPO since MN. VSS on RA. Tele: GORDON

## 2019-11-21 NOTE — CARE PLAN
Problem: Communication  Goal: The ability to communicate needs accurately and effectively will improve  Outcome: PROGRESSING AS EXPECTED   Patient educated to utilize call light. Patient oriented to hospital room. Patient encouraged to ask questions about plan of care. Patient effectively uses call light and is involved in POC.     Problem: Safety  Goal: Will remain free from injury  Outcome: PROGRESSING AS EXPECTED   Patient's risk for injury and falls assessed. Appropriate safety precautions in place. Patient educated to utilize call light for needs. Patient verbalizes understanding.

## 2019-11-21 NOTE — ASSESSMENT & PLAN NOTE
Improved but still orthopnea present  -H/O ischemic cardiomyopathy  -Likely due to acute exacerbation of congestive heart failure  BNP of 415 in [facility]  No JVD, but mild pitting edema  - Pulse oximetry  - I&O  - Resume home meds(aldactone, coreg, lasix)  - Correct K and Mg as needed

## 2019-11-21 NOTE — H&P
Internal Medicine Admitting History and Physical    Note Author: Amira Cool M.D.       Name Isiah Resendez     1970   Age/Sex 49 y.o. male   MRN [id number]   Code Status Full Code     After 5PM or if no immediate response to page, please call for cross-coverage  Attending/Team:    See Patient List for primary contact information  Call [phone] to page    1st Call - Day Intern (R1):   Dr. Tamayo 2nd Call - Day Sr. Resident (R2/R3):          Chief Complaint:   Chest Pain/SOB    HPI:  Pt. refused to provide history when met in person. As per history elicited by :    Isiah is 50 y/o Male with PMHx of CAD s/p PCI with 3*WILBERTO to RCA(2018), Ischemic Cardiomyopathy,Meth abuse, HTN is [facility].    Reports having chest pain for the previous 2 days, sharp type, intermittent, lasting for few minutes which later feels like pressure, not associated with exertion and relieved with rest. Denies radiation, palpaitations, sweating.    Shortness of breath for the past several weeks associated with orthopnea, PND. Reports having several bouts of coughing followed by shortness of breath. Denies associated leg swelling.    [facility]abs from [facility]- Troponin 0.6, EKG - T wave depression. He was transferred to [facility] due to elevated troponin and EKG changes.    Labs in [facility]:  Troponin T-32 -> 33  EKG:   SINUS RHYTHM   PROBABLE LEFT ATRIAL ABNORMALITY   PROBABLE INFERIOR INFARCT, OLD   LATERAL LEADS ARE ALSO INVOLVED   BORDERLINE PROLONGED QT INTERVAL  UDS: Positive for oxycodone      Review of Systems   Constitutional: Negative for chills, fever and weight loss.   HENT: Negative for ear discharge, ear pain, hearing loss and tinnitus.    Eyes: Negative for blurred vision, double vision and photophobia.   Respiratory: Positive for cough and shortness of breath. Negative for hemoptysis, sputum production and wheezing.     Cardiovascular: Positive for chest pain, orthopnea and PND. Negative for palpitations, claudication and leg swelling.   Gastrointestinal: Negative for abdominal pain, diarrhea, nausea and vomiting.   Genitourinary: Negative for dysuria, frequency and urgency.   Musculoskeletal: Negative for back pain, myalgias and neck pain.   Skin: Negative for itching and rash.   Neurological: Negative for dizziness, tingling, tremors and headaches.   Psychiatric/Behavioral: Negative for depression, hallucinations, substance abuse and suicidal ideas.             Past Medical History (Chronic medical problem, known complications and current treatment)    HTN,Nephrolithiasis,CAD s/p stenting    Past Surgical History:  Past Surgical History:   Procedure Laterality Date   • CYSTOSCOPY  1/13/2015    Performed by Eugenio Vargas M.D. at SURGERY [facility] ORS   • LASERTRIPSY  1/13/2015    Performed by Eugenio Vargas M.D. at SURGERY [facility] ORS   • LITHOTRIPSY  2009    kidney stones   • OTHER ORTHOPEDIC SURGERY  2001     R tibial plateu fracture   • OPEN REDUCTION     • OTHER      ORIF R elbow   • TONSILLECTOMY AND ADENOIDECTOMY         Current Outpatient Medications:  Home Medications     Reviewed by Manda Franco R.N. (Registered Nurse) on 11/21/19 at 0339  Med List Status: Partial   Medication Last Dose Status   aspirin (ASA) 81 MG Chew Tab chewable tablet 11/21/2019 Active   atorvastatin (LIPITOR) 80 MG tablet 11/21/2019 Active   carvedilol (COREG) 6.25 MG Tab 11/20/2019 Active   clopidogrel (PLAVIX) 75 MG Tab 11/20/2019 Active   lisinopril (PRINIVIL) 10 MG Tab 11/20/2019 Active   lisinopril (PRINIVIL, ZESTRIL) 40 MG tablet 11/20/2019 Active   potassium Chloride ER (K-TAB) 20 MEQ Tab CR tablet 11/21/2019 Active   spironolactone (ALDACTONE) 25 MG Tab 11/20/2019 Active                Medication Allergy/Sensitivities:  No Known Allergies      Family History (mandatory)   Family History   Problem Relation Age of Onset    • Other Mother         post polio   • Hypertension Mother    • Hyperlipidemia Mother    • Hyperlipidemia Father        Social History (mandatory)   Social History     Socioeconomic History   • Marital status: Single     Spouse name: Not on file   • Number of children: Not on file   • Years of education: Not on file   • Highest education level: Not on file   Occupational History   • Occupation:      Employer: [employer]   Social Needs   • Financial resource strain: Not on file   • Food insecurity:     Worry: Not on file     Inability: Not on file   • Transportation needs:     Medical: Not on file     Non-medical: Not on file   Tobacco Use   • Smoking status: Current Every Day Smoker     Packs/day: 1.00     Years: 25.00     Pack years: 25.00     Types: Cigarettes   • Smokeless tobacco: Never Used   Substance and Sexual Activity   • Alcohol use: Yes     Comment: 1-2 beers/month   • Drug use: No     Comment: experimented drugs in [place]   • Sexual activity: Yes     Partners: Female   Lifestyle   • Physical activity:     Days per week: Not on file     Minutes per session: Not on file   • Stress: Not on file   Relationships   • Social connections:     Talks on phone: Not on file     Gets together: Not on file     Attends Faith service: Not on file     Active member of club or organization: Not on file     Attends meetings of clubs or organizations: Not on file     Relationship status: Not on file   • Intimate partner violence:     Fear of current or ex partner: Not on file     Emotionally abused: Not on file     Physically abused: Not on file     Forced sexual activity: Not on file   Other Topics Concern   • Not on file   Social History Narrative   • Not on file     Living situation: Home  PCP : Avi Harrell M.D. (Inactive)    Physical Exam     Vitals:    11/21/19 0031 11/21/19 0101 11/21/19 0131 11/21/19 0221   BP: 129/88 135/91 149/99 150/106   Pulse: 80  94 91   Resp:    17   Temp:    36.3 °C  (97.3 °F)   TempSrc:    Temporal   SpO2:   93% 93%   Weight:    (!) 133 kg (293 lb 3.4 oz)   Height:         Body mass index is 37.65 kg/m².  O2 therapy: Pulse Oximetry: 93 %, O2 Delivery: None (Room Air)    Physical Exam   Constitutional: He is oriented to person, place, and time and well-developed, well-nourished, and in no distress.   HENT:   Head: Normocephalic and atraumatic.   Eyes: Pupils are equal, round, and reactive to light. Conjunctivae are normal.   Neck: Neck supple. No JVD present.   Cardiovascular: Normal rate, regular rhythm and normal heart sounds.   Pulmonary/Chest: Effort normal and breath sounds normal. No respiratory distress. He has no wheezes. He has no rales.   Abdominal: Soft. Bowel sounds are normal. He exhibits no distension. There is no tenderness. There is no rebound.   Musculoskeletal:         General: No tenderness or edema.   Neurological: He is alert and oriented to person, place, and time.         Data Review       Old Records Request:   Completed  Current Records review/summary: Completed    Lab Data Review:  Recent Results (from the past 24 hour(s))   EKG    Collection Time: 19  9:28 PM   Result Value Ref Range    Report       [facility] Emergency Dept.    Test Date:  2019  Pt Name:    LINNEA WILLINGHAM                 Department: ER  MRN:        [id number]                      Room:       [facility]  Gender:     Male                         Technician: [id number]  :        1970                   Requested By:ER TRIAGE PROTOCOL  Order #:    [id number]                    Reading MD: GINA GARCIA MD    Measurements  Intervals                                Axis  Rate:       80                           P:          7  LA:         176                          QRS:        -73  QRSD:       98                           T:          102  QT:         424  QTc:        490    Interpretive Statements  SINUS RHYTHM  PROBABLE LEFT ATRIAL ABNORMALITY  PROBABLE  INFERIOR INFARCT, OLD  LATERAL LEADS ARE ALSO INVOLVED  BORDERLINE PROLONGED QT INTERVAL  No previous ECG available for comparison  Compared to outside EKG, St depression less.  Electronically Signed On 11- 23:39:47 PST by GINA ONEAL MD     Troponin STAT    Collection Time: 11/20/19  9:30 PM   Result Value Ref Range    Troponin T 32 (H) 6 - 19 ng/L   TROPONIN    Collection Time: 11/21/19 12:54 AM   Result Value Ref Range    Troponin T 33 (H) 6 - 19 ng/L   Comp Metabolic Panel    Collection Time: 11/21/19 12:54 AM   Result Value Ref Range    Sodium 137 135 - 145 mmol/L    Potassium 3.8 3.6 - 5.5 mmol/L    Chloride 104 96 - 112 mmol/L    Co2 24 20 - 33 mmol/L    Anion Gap 9.0 0.0 - 11.9    Glucose 127 (H) 65 - 99 mg/dL    Bun 15 8 - 22 mg/dL    Creatinine 0.98 0.50 - 1.40 mg/dL    Calcium 8.8 8.5 - 10.5 mg/dL    AST(SGOT) 17 12 - 45 U/L    ALT(SGPT) 22 2 - 50 U/L    Alkaline Phosphatase 110 (H) 30 - 99 U/L    Total Bilirubin 0.5 0.1 - 1.5 mg/dL    Albumin 4.1 3.2 - 4.9 g/dL    Total Protein 6.9 6.0 - 8.2 g/dL    Globulin 2.8 1.9 - 3.5 g/dL    A-G Ratio 1.5 g/dL   CBC WITH DIFFERENTIAL    Collection Time: 11/21/19 12:54 AM   Result Value Ref Range    WBC 10.3 4.8 - 10.8 K/uL    RBC 5.70 4.70 - 6.10 M/uL    Hemoglobin 17.2 14.0 - 18.0 g/dL    Hematocrit 51.8 42.0 - 52.0 %    MCV 90.9 81.4 - 97.8 fL    MCH 30.2 27.0 - 33.0 pg    MCHC 33.2 (L) 33.7 - 35.3 g/dL    RDW 45.9 35.9 - 50.0 fL    Platelet Count 289 164 - 446 K/uL    MPV 9.7 9.0 - 12.9 fL    Neutrophils-Polys 60.70 44.00 - 72.00 %    Lymphocytes 29.50 22.00 - 41.00 %    Monocytes 7.50 0.00 - 13.40 %    Eosinophils 1.30 0.00 - 6.90 %    Basophils 0.50 0.00 - 1.80 %    Immature Granulocytes 0.50 0.00 - 0.90 %    Nucleated RBC 0.00 /100 WBC    Neutrophils (Absolute) 6.28 1.82 - 7.42 K/uL    Lymphs (Absolute) 3.05 1.00 - 4.80 K/uL    Monos (Absolute) 0.78 0.00 - 0.85 K/uL    Eos (Absolute) 0.13 0.00 - 0.51 K/uL    Baso (Absolute) 0.05 0.00 - 0.12  K/uL    Immature Granulocytes (abs) 0.05 0.00 - 0.11 K/uL    NRBC (Absolute) 0.00 K/uL   MAGNESIUM    Collection Time: 19 12:54 AM   Result Value Ref Range    Magnesium 1.8 1.5 - 2.5 mg/dL   ESTIMATED GFR    Collection Time: 19 12:54 AM   Result Value Ref Range    GFR If African American >60 >60 mL/min/1.73 m 2    GFR If Non African American >60 >60 mL/min/1.73 m 2   URINE DRUG SCREEN    Collection Time: 19  2:40 AM   Result Value Ref Range    Amphetamines Urine Negative Negative    Barbiturates Negative Negative    Benzodiazepines Negative Negative    Cocaine Metabolite Negative Negative    Methadone Negative Negative    Opiates Negative Negative    Oxycodone Positive (A) Negative    Phencyclidine -Pcp Negative Negative    Propoxyphene Negative Negative    Cannabinoid Metab Negative Negative   EKG    Collection Time: 19  4:34 AM   Result Value Ref Range    Report       [facility]    Test Date:  2019  Pt Name:    LINNEA WILLINGHAM                 Department: ER  MRN:        [id number]                      Room:       [facility]  Gender:     Male                         Technician: RONALD  :        1970                   Requested By:JACI BERMUDEZ  Order #:    [id number]                    Reading MD:    Measurements  Intervals                                Axis  Rate:       90                           P:          44  DC:         176                          QRS:        248  QRSD:       104                          T:          92  QT:         412  QTc:        504    Interpretive Statements  SINUS RHYTHM  PROBABLE LEFT ATRIAL ABNORMALITY  RIGHT AXIS DEVIATION  INFERIOR INFARCT, OLD  LATERAL LEADS ARE ALSO INVOLVED  PROLONGED QT INTERVAL  Compared to ECG 2019 21:28:47  Right-axis deviation now present  Myocardial infarct finding still present         Imaging/Procedures Review:    Independant Imaging Review: Completed  No orders to display            EKG:   EKG Independent Review:  Completed  QTc:504, HR: 90, Normal Sinus Rhythm, T wave inversions in V4-V6&AVL    Records reviewed and summarized in current documentation :  Yes  [employer] teaching service handout given to patient:  Yes         Assessment/Plan     Chest pain  Assessment & Plan  Prior history of CAD s/p PCI  Troponin T - 32 ->33  EKG shows T wave inversions in V4-V6 & AVL, similar to previous EKG findings.  Plan:  - Day team to discuss regarding the need for stress test  - NPO at midnight        Shortness of breath  Assessment & Plan  H/O ischemic cardiomyopathy  Does not appear to be in acute exacerbation, saturating well on room air  BNP of 415 [facility]  Chronic progressive symptoms with no signs of fluid overload  No elevated JVD, pedal edema  Symptoms likely secondary to deteriorated heart function  Plan:  - Pulse oximetry  - I&O  - Day team to decide on getting ECHO  - Resume home meds(aldactone, coreg, lasix)  - Correct K and Mg as needed          Anticipated Hospital stay: Observation admit        Quality Measures  Quality-Core Measures   Reviewed items::  EKG reviewed, Labs reviewed, Medications reviewed and Radiology images reviewed  DVT prophylaxis pharmacological::  Enoxaparin (Lovenox)    PCP: Avi Harrell M.D. (Inactive)

## 2019-11-22 ENCOUNTER — PATIENT OUTREACH (OUTPATIENT)
Dept: HEALTH INFORMATION MANAGEMENT | Facility: OTHER | Age: 49
End: 2019-11-22

## 2019-11-22 VITALS
HEART RATE: 74 BPM | SYSTOLIC BLOOD PRESSURE: 98 MMHG | WEIGHT: 293.87 LBS | BODY MASS INDEX: 37.72 KG/M2 | RESPIRATION RATE: 17 BRPM | DIASTOLIC BLOOD PRESSURE: 73 MMHG | TEMPERATURE: 96.9 F | OXYGEN SATURATION: 96 % | HEIGHT: 74 IN

## 2019-11-22 LAB
ALBUMIN SERPL BCP-MCNC: 4.6 G/DL (ref 3.2–4.9)
ALBUMIN/GLOB SERPL: 1.8 G/DL
ALP SERPL-CCNC: 128 U/L (ref 30–99)
ALT SERPL-CCNC: 26 U/L (ref 2–50)
ANION GAP SERPL CALC-SCNC: 13 MMOL/L (ref 0–11.9)
AST SERPL-CCNC: 20 U/L (ref 12–45)
BASOPHILS # BLD AUTO: 0.5 % (ref 0–1.8)
BASOPHILS # BLD: 0.06 K/UL (ref 0–0.12)
BILIRUB SERPL-MCNC: 0.6 MG/DL (ref 0.1–1.5)
BUN SERPL-MCNC: 18 MG/DL (ref 8–22)
CALCIUM SERPL-MCNC: 9.4 MG/DL (ref 8.5–10.5)
CHLORIDE SERPL-SCNC: 104 MMOL/L (ref 96–112)
CO2 SERPL-SCNC: 22 MMOL/L (ref 20–33)
CREAT SERPL-MCNC: 1.11 MG/DL (ref 0.5–1.4)
EOSINOPHIL # BLD AUTO: 0.14 K/UL (ref 0–0.51)
EOSINOPHIL NFR BLD: 1.3 % (ref 0–6.9)
ERYTHROCYTE [DISTWIDTH] IN BLOOD BY AUTOMATED COUNT: 45.3 FL (ref 35.9–50)
GLOBULIN SER CALC-MCNC: 2.6 G/DL (ref 1.9–3.5)
GLUCOSE SERPL-MCNC: 121 MG/DL (ref 65–99)
HCT VFR BLD AUTO: 56.1 % (ref 42–52)
HGB BLD-MCNC: 18.9 G/DL (ref 14–18)
IMM GRANULOCYTES # BLD AUTO: 0.06 K/UL (ref 0–0.11)
IMM GRANULOCYTES NFR BLD AUTO: 0.5 % (ref 0–0.9)
LYMPHOCYTES # BLD AUTO: 2.56 K/UL (ref 1–4.8)
LYMPHOCYTES NFR BLD: 23.3 % (ref 22–41)
MCH RBC QN AUTO: 30.4 PG (ref 27–33)
MCHC RBC AUTO-ENTMCNC: 33.7 G/DL (ref 33.7–35.3)
MCV RBC AUTO: 90.2 FL (ref 81.4–97.8)
MONOCYTES # BLD AUTO: 0.79 K/UL (ref 0–0.85)
MONOCYTES NFR BLD AUTO: 7.2 % (ref 0–13.4)
NEUTROPHILS # BLD AUTO: 7.37 K/UL (ref 1.82–7.42)
NEUTROPHILS NFR BLD: 67.2 % (ref 44–72)
NRBC # BLD AUTO: 0 K/UL
NRBC BLD-RTO: 0 /100 WBC
PLATELET # BLD AUTO: 316 K/UL (ref 164–446)
PMV BLD AUTO: 9.9 FL (ref 9–12.9)
POTASSIUM SERPL-SCNC: 4.2 MMOL/L (ref 3.6–5.5)
PROT SERPL-MCNC: 7.2 G/DL (ref 6–8.2)
RBC # BLD AUTO: 6.22 M/UL (ref 4.7–6.1)
SODIUM SERPL-SCNC: 139 MMOL/L (ref 135–145)
WBC # BLD AUTO: 11 K/UL (ref 4.8–10.8)

## 2019-11-22 PROCEDURE — A9270 NON-COVERED ITEM OR SERVICE: HCPCS | Performed by: STUDENT IN AN ORGANIZED HEALTH CARE EDUCATION/TRAINING PROGRAM

## 2019-11-22 PROCEDURE — 700111 HCHG RX REV CODE 636 W/ 250 OVERRIDE (IP): Performed by: STUDENT IN AN ORGANIZED HEALTH CARE EDUCATION/TRAINING PROGRAM

## 2019-11-22 PROCEDURE — 80053 COMPREHEN METABOLIC PANEL: CPT

## 2019-11-22 PROCEDURE — 36415 COLL VENOUS BLD VENIPUNCTURE: CPT

## 2019-11-22 PROCEDURE — 700102 HCHG RX REV CODE 250 W/ 637 OVERRIDE(OP): Performed by: STUDENT IN AN ORGANIZED HEALTH CARE EDUCATION/TRAINING PROGRAM

## 2019-11-22 PROCEDURE — 96376 TX/PRO/DX INJ SAME DRUG ADON: CPT

## 2019-11-22 PROCEDURE — G0378 HOSPITAL OBSERVATION PER HR: HCPCS

## 2019-11-22 PROCEDURE — 85025 COMPLETE CBC W/AUTO DIFF WBC: CPT

## 2019-11-22 PROCEDURE — 99217 PR OBSERVATION CARE DISCHARGE: CPT | Mod: GC | Performed by: INTERNAL MEDICINE

## 2019-11-22 PROCEDURE — 96372 THER/PROPH/DIAG INJ SC/IM: CPT

## 2019-11-22 RX ORDER — ATORVASTATIN CALCIUM 40 MG/1
40 TABLET, FILM COATED ORAL EVERY EVENING
Qty: 30 TAB | Refills: 2 | Status: SHIPPED | OUTPATIENT
Start: 2019-11-22

## 2019-11-22 RX ORDER — FUROSEMIDE 40 MG/1
40 TABLET ORAL DAILY
Qty: 60 TAB | Refills: 2 | Status: SHIPPED | OUTPATIENT
Start: 2019-11-22

## 2019-11-22 RX ORDER — CARVEDILOL 12.5 MG/1
12.5 TABLET ORAL 2 TIMES DAILY WITH MEALS
Qty: 60 TAB | Refills: 2 | Status: SHIPPED
Start: 2019-11-22 | End: 2020-01-23

## 2019-11-22 RX ADMIN — GUAIFENESIN 200 MG: 100 SOLUTION ORAL at 04:59

## 2019-11-22 RX ADMIN — CARVEDILOL 12.5 MG: 12.5 TABLET, FILM COATED ORAL at 09:26

## 2019-11-22 RX ADMIN — ASPIRIN 81 MG: 81 TABLET, COATED ORAL at 04:59

## 2019-11-22 RX ADMIN — ENOXAPARIN SODIUM 40 MG: 100 INJECTION SUBCUTANEOUS at 04:59

## 2019-11-22 RX ADMIN — FUROSEMIDE 40 MG: 10 INJECTION, SOLUTION INTRAMUSCULAR; INTRAVENOUS at 05:00

## 2019-11-22 RX ADMIN — SPIRONOLACTONE 25 MG: 25 TABLET ORAL at 04:59

## 2019-11-22 RX ADMIN — LISINOPRIL 10 MG: 10 TABLET ORAL at 04:59

## 2019-11-22 ASSESSMENT — ENCOUNTER SYMPTOMS
DEPRESSION: 0
CHILLS: 0
BACK PAIN: 0
HEARTBURN: 0
FEVER: 0
HEMOPTYSIS: 0
HEADACHES: 0
NECK PAIN: 0
TINGLING: 0
DOUBLE VISION: 0
ORTHOPNEA: 1
DIZZINESS: 0
PALPITATIONS: 0
SHORTNESS OF BREATH: 0
PHOTOPHOBIA: 0
BRUISES/BLEEDS EASILY: 0
VOMITING: 0
WEIGHT LOSS: 0
CLAUDICATION: 0
COUGH: 0
SPUTUM PRODUCTION: 0
NAUSEA: 0
BLURRED VISION: 0
MYALGIAS: 0

## 2019-11-22 ASSESSMENT — PATIENT HEALTH QUESTIONNAIRE - PHQ9
SUM OF ALL RESPONSES TO PHQ9 QUESTIONS 1 AND 2: 0
1. LITTLE INTEREST OR PLEASURE IN DOING THINGS: NOT AT ALL
2. FEELING DOWN, DEPRESSED, IRRITABLE, OR HOPELESS: NOT AT ALL

## 2019-11-22 ASSESSMENT — LIFESTYLE VARIABLES: SUBSTANCE_ABUSE: 0

## 2019-11-22 NOTE — PROGRESS NOTES
Received report from night shift RN.  Pt is a wake and laying in bed.  Denies any pain at this time.  Will continue to monitor.

## 2019-11-22 NOTE — PROGRESS NOTES
Bedside report received from day RN. Assumed care of pt at 1900. Pt is on RA. Pt has tele box in place. No s/s of pain or discomfort. Educated to call light. Bed is in low and locked position. Will continue to monitor.

## 2019-11-22 NOTE — PROGRESS NOTES
Internal Medicine Interval Note  Note Author: James Tamayo M.D.     Name Isiah Resendez     1970   Age/Sex 49 y.o. male   MRN [id number]   Code Status  full code     After 5PM or if no immediate response to page, please call for cross-coverage  Attending/Team: /Eran See Patient List for primary contact information  Call [phone] to page    1st Call - Day Intern (R1):    2nd Call - Day Sr. Resident (R2/R3):   Dr. Galvan         Reason for interval visit  (Principal Problem)   Chest pain, shortness of breath.      Interval Problem Daily Status Update  (24 hours, problem oriented, brief subjective history, new lab/imaging data pertinent to that problem)   49 yr old male with known h/o of CAD s/p PCI with 3 WILBERTO to RCA, [facility] with c/o chest pain and shortness of breath for ACS rule out.  Pt comfortable in bed, no significant distress, c/o orthopnea, NYHA III dyspnea, cough associated with no sputum, improved from sitting straight up, chest pain improved since hospitalization. Troponin non significant elevation for ischemia. No other complaints  Echo revealed significant LV systolic dysfunction with global hypokinesia and EF of 20%.    Review of Systems   Constitutional: Positive for malaise/fatigue. Negative for chills, fever and weight loss.   HENT: Negative for ear discharge, ear pain, hearing loss and tinnitus.    Eyes: Negative for blurred vision, double vision and photophobia.   Respiratory: Positive for cough and shortness of breath. Negative for hemoptysis and sputum production.    Cardiovascular: Positive for chest pain and orthopnea. Negative for palpitations, claudication and leg swelling.   Gastrointestinal: Negative for heartburn, nausea and vomiting.   Genitourinary: Negative for dysuria, frequency and urgency.   Musculoskeletal: Negative for back pain, myalgias and neck pain.   Skin: Negative for rash.   Neurological:  Negative for dizziness, tingling and headaches.   Endo/Heme/Allergies: Negative for environmental allergies. Does not bruise/bleed easily.   Psychiatric/Behavioral: Negative for depression, substance abuse and suicidal ideas.       Disposition/Barriers to discharge:   Home when medically stable.    Consultants/Specialty    PCP: Avi Harrell M.D. (Inactive)      Quality Measures  Quality-Core Measures   Reviewed items::  EKG reviewed, Labs reviewed, Medications reviewed and Radiology images reviewed  Mg catheter::  No Mg  DVT prophylaxis pharmacological::  Enoxaparin (Lovenox)          Physical Exam       Vitals:    11/21/19 0131 11/21/19 0221 11/21/19 0752 11/21/19 1130   BP: 149/99 150/106 130/94 125/91   Pulse: 94 91 91 95   Resp:  17 18 18   Temp:  36.3 °C (97.3 °F) 36.6 °C (97.9 °F) 36.4 °C (97.5 °F)   TempSrc:  Temporal Temporal Temporal   SpO2: 93% 93% 90% 90%   Weight:  (!) 133 kg (293 lb 3.4 oz)     Height:         Body mass index is 37.65 kg/m². Weight: (!) 133 kg (293 lb 3.4 oz)  Oxygen Therapy:  Pulse Oximetry: 90 %, O2 (LPM): 0, O2 Delivery: None (Room Air)    Physical Exam   Constitutional: He is oriented to person, place, and time and well-developed, well-nourished, and in no distress. No distress.   HENT:   Head: Normocephalic and atraumatic.   Mouth/Throat: No oropharyngeal exudate.   Eyes: Pupils are equal, round, and reactive to light. Conjunctivae and EOM are normal.   Neck: Normal range of motion. Neck supple. No thyromegaly present.   Cardiovascular: Normal rate and regular rhythm.   Pulmonary/Chest: He has no wheezes. He has rales.   Mild bibasilar crackles.   Abdominal: Bowel sounds are normal. He exhibits no distension. There is no tenderness.   Musculoskeletal: Normal range of motion.         General: Edema present. No deformity.      Comments: Mild pitting edema   Neurological: He is alert and oriented to person, place, and time.   Skin: He is diaphoretic.   Psychiatric: Memory,  affect and judgment normal.             Assessment/Plan     * Shortness of breath  Assessment & Plan  H/O ischemic cardiomyopathy  -Likely due to acute exacerbation of congestive heart failure  BNP of 415 [facility]  No JVD, but mild pitting edema  - Pulse oximetry  - I&O  - Resume home meds(aldactone, coreg, lasix)  - Correct K and Mg as needed        Congestive heart failure (CHF) ([facility])  Assessment & Plan  Known history of coronary artery disease status post PCI and 3 WILBERTO in RCA.  Patient underwent catheterization 2 months ago in [place] which revealed ischemic heart disease with inferior hypokinesis and ejection fraction of 40%.  -Echo today revealed severe LV dysfunction with ejection fraction of 20% and global hypokinesia.  -Continue aspirin, lisinopril, statin, carvedilol, spironolactone.  -IV furosemide 40 mg.  -Continue telemetry.    Chest pain  Assessment & Plan  Prior history of CAD s/p PCI  Troponin T - 32 ->33  EKG shows T wave inversions in V4-V6 & AVL, similar to previous EKG findings.  - Telemetry  -Chest pain improved since hospitalization.

## 2019-11-22 NOTE — DISCHARGE INSTRUCTIONS
Heart Failure  Heart failure is a condition in which the heart has trouble pumping blood because it has become weak or stiff. This means that the heart does not pump blood efficiently for the body to work well. For some people with heart failure, fluid may back up into the lungs and there may be swelling (edema) in the lower legs. Heart failure is usually a long-term (chronic) condition. It is important for you to take good care of yourself and follow the treatment plan from your health care provider.  What are the causes?  This condition is caused by some health problems, including:  · High blood pressure (hypertension). Hypertension causes the heart muscle to work harder than normal. High blood pressure eventually causes the heart to become stiff and weak.  · Coronary artery disease (CAD). CAD is the buildup of cholesterol and fat (plaques) in the arteries of the heart.  · Heart attack (myocardial infarction). Injured tissue, which is caused by the heart attack, does not contract as well and the heart's ability to pump blood is weakened.  · Abnormal heart valves. When the heart valves do not open and close properly, the heart muscle must pump harder to keep the blood flowing.  · Heart muscle disease (cardiomyopathy or myocarditis). Heart muscle disease is damage to the heart muscle from a variety of causes, such as drug or alcohol abuse, infections, or unknown causes. These can increase the risk of heart failure.  · Lung disease. When the lungs do not work properly, the heart must work harder.  What increases the risk?  Risk of heart failure increases as a person ages. This condition is also more likely to develop in people who:  · Are overweight.  · Are male.  · Smoke or chew tobacco.  · Abuse alcohol or illegal drugs.  · Have taken medicines that can damage the heart, such as chemotherapy drugs.  · Have diabetes.  ¨ High blood sugar (glucose) is associated with high fat (lipid) levels in the blood.  ¨ Diabetes  can also damage tiny blood vessels that carry nutrients to the heart muscle.  · Have abnormal heart rhythms.  · Have thyroid problems.  · Have low blood counts (anemia).  What are the signs or symptoms?  Symptoms of this condition include:  · Shortness of breath with activity, such as when climbing stairs.  · Persistent cough.  · Swelling of the feet, ankles, legs, or abdomen.  · Unexplained weight gain.  · Difficulty breathing when lying flat (orthopnea).  · Waking from sleep because of the need to sit up and get more air.  · Rapid heartbeat.  · Fatigue and loss of energy.  · Feeling light-headed, dizzy, or close to fainting.  · Loss of appetite.  · Nausea.  · Increased urination during the night (nocturia).  · Confusion.  How is this diagnosed?  This condition is diagnosed based on:  · Medical history, symptoms, and a physical exam.  · Diagnostic tests, which may include:  ¨ Echocardiogram.  ¨ Electrocardiogram (ECG).  ¨ Chest X-ray.  ¨ Blood tests.  ¨ Exercise stress test.  ¨ Radionuclide scans.  ¨ Cardiac catheterization and angiogram.  How is this treated?  Treatment for this condition is aimed at managing the symptoms of heart failure. Medicines, behavioral changes, or other treatments may be necessary to treat heart failure.  Medicines   These may include:  · Angiotensin-converting enzyme (ACE) inhibitors. This type of medicine blocks the effects of a blood protein called angiotensin-converting enzyme. ACE inhibitors relax (dilate) the blood vessels and help to lower blood pressure.  · Angiotensin receptor blockers (ARBs). This type of medicine blocks the actions of a blood protein called angiotensin. ARBs dilate the blood vessels and help to lower blood pressure.  · Water pills (diuretics). Diuretics cause the kidneys to remove salt and water from the blood. The extra fluid is removed through urination, leaving a lower volume of blood that the heart has to pump.  · Beta blockers. These improve heart muscle  strength and they prevent the heart from beating too quickly.  · Digoxin. This increases the force of the heartbeat.  Healthy behavior changes   These may include:  · Reaching and maintaining a healthy weight.  · Stopping smoking or chewing tobacco.  · Eating heart-healthy foods.  · Limiting or avoiding alcohol.  · Stopping use of street drugs (illegal drugs).  · Physical activity.  Other treatments   These may include:  · Surgery to open blocked coronary arteries or repair damaged heart valves.  · Placement of a biventricular pacemaker to improve heart muscle function (cardiac resynchronization therapy). This device paces both the right ventricle and left ventricle.  · Placement of a device to treat serious abnormal heart rhythms (implantable cardioverter defibrillator, or ICD).  · Placement of a device to improve the pumping ability of the heart (left ventricular assist device, or LVAD).  · Heart transplant. This can cure heart failure, and it is considered for certain patients who do not improve with other therapies.  Follow these instructions at home:  Medicines  · Take over-the-counter and prescription medicines only as told by your health care provider. Medicines are important in reducing the workload of your heart, slowing the progression of heart failure, and improving your symptoms.  ¨ Do not stop taking your medicine unless your health care provider told you to do that.  ¨ Do not skip any dose of medicine.  ¨ Refill your prescriptions before you run out of medicine. You need your medicines every day.  Eating and drinking  · Eat heart-healthy foods. Talk with a dietitian to make an eating plan that is right for you.  ¨ Choose foods that contain no trans fat and are low in saturated fat and cholesterol. Healthy choices include fresh or frozen fruits and vegetables, fish, lean meats, legumes, fat-free or low-fat dairy products, and whole-grain or high-fiber foods.  ¨ Limit salt (sodium) if directed by your  health care provider. Sodium restriction may reduce symptoms of heart failure. Ask a dietitian to recommend heart-healthy seasonings.  ¨ Use healthy cooking methods instead of frying. Healthy methods include roasting, grilling, broiling, baking, poaching, steaming, and stir-frying.  · Limit your fluid intake if directed by your health care provider. Fluid restriction may reduce symptoms of heart failure.  Lifestyle  · Stop smoking or using chewing tobacco. Nicotine and tobacco can damage your heart and your blood vessels. Do not use nicotine gum or patches before talking to your health care provider.  · Limit alcohol intake to no more than 1 drink per day for non-pregnant women and 2 drinks per day for men. One drink equals 12 oz of beer, 5 oz of wine, or 1½ oz of hard liquor.  ¨ Drinking more than that is harmful to your heart. Tell your health care provider if you drink alcohol several times a week.  ¨ Talk with your health care provider about whether any level of alcohol use is safe for you.  ¨ If your heart has already been damaged by alcohol or you have severe heart failure, drinking alcohol should be stopped completely.  · Stop use of illegal drugs.  · Lose weight if directed by your health care provider. Weight loss may reduce symptoms of heart failure.  · Do moderate physical activity if directed by your health care provider. People who are elderly and people with severe heart failure should consult with a health care provider for physical activity recommendations.  Monitor important information  · Weigh yourself every day. Keeping track of your weight daily helps you to notice excess fluid sooner.  ¨ Weigh yourself every morning after you urinate and before you eat breakfast.  ¨ Wear the same amount of clothing each time you weigh yourself.  ¨ Record your daily weight. Provide your health care provider with your weight record.  · Monitor and record your blood pressure as told by your health care  provider.  · Check your pulse as told by your health care provider.  Dealing with extreme temperatures  · If the weather is extremely hot:  ¨ Avoid vigorous physical activity.  ¨ Use air conditioning or fans or seek a cooler location.  ¨ Avoid caffeine and alcohol.  ¨ Wear loose-fitting, lightweight, and light-colored clothing.  · If the weather is extremely cold:  ¨ Avoid vigorous physical activity.  ¨ Layer your clothes.  ¨ Wear mittens or gloves, a hat, and a scarf when you go outside.  ¨ Avoid alcohol.  General instructions  · Manage other health conditions such as hypertension, diabetes, thyroid disease, or abnormal heart rhythms as told by your health care provider.  · Learn to manage stress. If you need help to do this, ask your health care provider.  · Plan rest periods when fatigued.  · Get ongoing education and support as needed.  · Participate in or seek rehabilitation as needed to maintain or improve independence and quality of life.  · Stay up to date with immunizations. Keeping current on pneumococcal and influenza immunizations is especially important to prevent respiratory infections.  · Keep all follow-up visits as told by your health care provider. This is important.  Contact a health care provider if:  · You have a rapid weight gain.  · You have increasing shortness of breath that is unusual for you.  · You are unable to participate in your usual physical activities.  · You tire easily.  · You cough more than normal, especially with physical activity.  · You have any swelling or more swelling in areas such as your hands, feet, ankles, or abdomen.  · You are unable to sleep because it is hard to breathe.  · You feel like your heart is beating quickly (palpitations).  · You become dizzy or light-headed when you stand up.  Get help right away if:  · You have difficulty breathing.  · You notice or your family notices a change in your awareness, such as having trouble staying awake or having difficulty  with concentration.  · You have pain or discomfort in your chest.  · You have an episode of fainting (syncope).  This information is not intended to replace advice given to you by your health care provider. Make sure you discuss any questions you have with your health care provider.  Document Released: 12/18/2006 Document Revised: 08/22/2017 Document Reviewed: 07/12/2017  [employer] Interactive Patient Education © 2017 [employer]  Discharge Against Medical Advice  Discharge Instructions    Discharged to home by car with friend. Discharged via wheelchair, hospital escort: Yes.  Special equipment needed: Not Applicable    Be sure to schedule a follow-up appointment with your primary care doctor or any specialists as instructed.     Discharge Plan:   Diet Plan: Discussed  Activity Level: Discussed  Smoking Cessation Offered: Patient Counseled  Confirmed Follow up Appointment: Patient to Call and Schedule Appointment  Confirmed Symptoms Management: Discussed  Medication Reconciliation Updated: Yes  Influenza Vaccine Indication: Patient Refuses    I understand that a diet low in cholesterol, fat, and sodium is recommended for good health. Unless I have been given specific instructions below for another diet, I accept this instruction as my diet prescription.   Other diet: regular    Special Instructions: None    · Is patient discharged on Warfarin / Coumadin?   No     Depression / Suicide Risk    As you are discharged from this [facility] facility, it is important to learn how to keep safe from harming yourself.    Recognize the warning signs:  · Abrupt changes in personality, positive or negative- including increase in energy   · Giving away possessions  · Change in eating patterns- significant weight changes-  positive or negative  · Change in sleeping patterns- unable to sleep or sleeping all the time   · Unwillingness or inability to communicate  · Depression  · Unusual sadness, discouragement and  loneliness  · Talk of wanting to die  · Neglect of personal appearance   · Rebelliousness- reckless behavior  · Withdrawal from people/activities they love  · Confusion- inability to concentrate     If you or a loved one observes any of these behaviors or has concerns about self-harm, here's what you can do:  · Talk about it- your feelings and reasons for harming yourself  · Remove any means that you might use to hurt yourself (examples: pills, rope, extension cords, firearm)  · Get professional help from the community (Mental Health, Substance Abuse, psychological counseling)  · Do not be alone:Call your Safe Contact- someone whom you trust who will be there for you.  · Call your local CRISIS HOTLINE 008-9744 or 912-909-9259  · Call your local Children's Mobile Crisis Response Team [place] (639) 238-3864 or www.[employer]  · Call the toll free National Suicide Prevention Hotlines   · National Suicide Prevention Lifeline 579-221-YFOV (0353)  · National Hope Line Network 800-SUICIDE (218-0293)

## 2019-11-22 NOTE — PROGRESS NOTES
Internal Medicine Interval Note  Note Author: James Tamayo M.D.     Name Isiah Resendez     1970   Age/Sex 49 y.o. male   MRN [id number]   Code Status  full code     After 5PM or if no immediate response to page, please call for cross-coverage  Attending/Team: /Eran See Patient List for primary contact information  Call [phone] to page    1st Call - Day Intern (R1):    2nd Call - Day Sr. Resident (R2/R3):   Dr. Galvan         Reason for interval visit  (Principal Problem)   Chest pain, shortness of breath.      Interval Problem Daily Status Update  (24 hours, problem oriented, brief subjective history, new lab/imaging data pertinent to that problem)   49 yr old male with known h/o of CAD s/p PCI with 3 WILBERTO to [facility], [facility] with c/o chest pain and shortness of breath for ACS rule out.  Pt comfortable in bed, no significant distress, c/o orthopnea, NYHA III dyspnea, cough and chest pain improved with diuresis since hospitalization. Troponin non significant elevation for ischemia. Echo revealed significant LV systolic dysfunction with global hypokinesia and EF of 20%.  No other complaints.  Patient diuresed with IV furosemide and had negative of 1220 mL was obtained.  Patient mentioned he improved significantly back to his baseline.  No other complaints.     Review of Systems   Constitutional: Negative for chills, fever, malaise/fatigue and weight loss.   HENT: Negative for ear discharge, ear pain, hearing loss and tinnitus.    Eyes: Negative for blurred vision, double vision and photophobia.   Respiratory: Negative for cough, hemoptysis, sputum production and shortness of breath.    Cardiovascular: Positive for orthopnea. Negative for chest pain, palpitations, claudication and leg swelling.   Gastrointestinal: Negative for heartburn, nausea and vomiting.   Genitourinary: Negative for dysuria, frequency and urgency.    Musculoskeletal: Negative for back pain, myalgias and neck pain.   Skin: Negative for rash.   Neurological: Negative for dizziness, tingling and headaches.   Endo/Heme/Allergies: Negative for environmental allergies. Does not bruise/bleed easily.   Psychiatric/Behavioral: Negative for depression, substance abuse and suicidal ideas.       Disposition/Barriers to discharge:   Home when medically stable.    Consultants/Specialty    PCP: Avi Harrell M.D. (Inactive)      Quality Measures  Quality-Core Measures   Reviewed items::  EKG reviewed, Labs reviewed, Medications reviewed and Radiology images reviewed  Mg catheter::  No Mg  DVT prophylaxis pharmacological::  Enoxaparin (Lovenox)          Physical Exam       Vitals:    11/21/19 2338 11/22/19 0350 11/22/19 0733 11/22/19 1200   BP: 115/62 128/81 112/76 (!) 98/73   Pulse: 86 80 86 74   Resp: 17 18 18 17   Temp: 36.3 °C (97.4 °F) 36.9 °C (98.4 °F) 36.1 °C (97 °F) 36.1 °C (96.9 °F)   TempSrc: Temporal Temporal Temporal Temporal   SpO2: 91% 88% 91% 96%   Weight:       Height:         Body mass index is 37.73 kg/m². Weight: (!) 133.3 kg (293 lb 14 oz)  Oxygen Therapy:  Pulse Oximetry: 96 %, O2 (LPM): 0, O2 Delivery: None (Room Air)    Physical Exam   Constitutional: He is oriented to person, place, and time and well-developed, well-nourished, and in no distress. No distress.   HENT:   Head: Normocephalic and atraumatic.   Mouth/Throat: No oropharyngeal exudate.   Eyes: Pupils are equal, round, and reactive to light. Conjunctivae and EOM are normal.   Neck: Normal range of motion. Neck supple. No thyromegaly present.   Cardiovascular: Normal rate and regular rhythm.   Pulmonary/Chest: He has no wheezes. He has no rales.   Mild bibasilar crackles.   Abdominal: Bowel sounds are normal. He exhibits no distension. There is no tenderness.   Musculoskeletal: Normal range of motion.         General: No deformity or edema.      Comments: Mild pitting edema    Neurological: He is alert and oriented to person, place, and time.   Skin: He is not diaphoretic.   Psychiatric: Memory, affect and judgment normal.             Assessment/Plan     * Shortness of breath  Assessment & Plan  Improved but still orthopnea present  -H/O ischemic cardiomyopathy  -Likely due to acute exacerbation of congestive heart failure  BNP of 415 in [facility]  No JVD, but mild pitting edema  - Pulse oximetry  - I&O  - Resume home meds(aldactone, coreg, lasix)  - Correct K and Mg as needed        Congestive heart failure (CHF) (HCC)  Assessment & Plan  Known history of coronary artery disease status post PCI and 3 WILBERTO in [facility].  Patient underwent catheterization 2 months ago in [place] which revealed ischemic heart disease with inferior hypokinesis and ejection fraction of 40%.  -Echo today revealed severe LV dysfunction with ejection fraction of 20% and global hypokinesia.  -Continue aspirin, lisinopril, statin, carvedilol, spironolactone.  -Improved clinically with IV diuresis.  -Patient counseled regarding lifestyle modifications, dietary modifications and compliance to medication and a regular follow-up.  -We will plan for safe discharge with with furosemide 40 mg p.o. and recommendations to follow-up with primary care physician and cardiology.    Chest pain  Assessment & Plan  Prior history of CAD s/p PCI  Troponin T - 32 ->33  EKG shows T wave inversions in V4-V6 & AVL, similar to previous EKG findings.  - Telemetry  -Chest pain improved since hospitalization.

## 2019-11-22 NOTE — HEART FAILURE PROGRAM
Acutely decompensated HFrEF. Please see below for all measures that must be addressed prior to discharge.     Daily Nurse: please begin to fill out the HF checklist (pink sheet in hard chart) and use it to guide your daily care.    Discharge Nurse: please ensure completeness of the HF checklist (pink sheet in hard chart) and have it co-signed by the charge RN before the patient leaves the hospital.    Thank you, Rosemarie, Cardiovascular Nurse Navigator, RN, [facility]FN [phone]    HF Measures:  1. Documentation of LV systolic function (echo or cath) PTA, during this hospitalization, or plan to assess post discharge or reason for not assessing documented.  2. ACE-I, ARNI or ARB prescribed on discharge for LVEF <40%  3. For HF patients with LVEF less than or equal to 40% evidence based beta blocker must be prescribed upon discharge one of the following: carvedilol, bisoprolol, Toprol XL  4. For EF less than or equal to 35% aldosterone blockade prescribed upon discharge  5. Nutrition consult for diet education  6. HF education documented daily  7. Screening for and administering immunizations as long as no contraindications: Pneumonia and Influenza  8. Written discharge instructions include:  ? Daily weights  ? Record weight on tracker  ? Bring tracker to appointments  ? Call MD for weight gain of 3lb /day or 5lb/week  ? HF medication teaching  ? Low sodium diet  ? Follow up appointment within seven calendar days of d/c must include: date, time and location  ? Activity  ? Worsening symptoms  What if any of the above HF measures are contraindicated?  ? Request that the discharging provider document the medication/intervention and the contraindication specifically in a progress note  ? For example: “no CHF meds due to hypotension” is not enough. It needs to say: “No ACE-I, ARNI, ARB due to hypotension”; “No Beta Blockade due to bradycardia”…

## 2019-11-22 NOTE — DISCHARGE SUMMARY
Internal Medicine Discharge Summary  Note Author: James Tamayo M.D.       Name Isiah Resendez     1970   Age/Sex 49 y.o. male   MRN [id number]         Admit Date:  2019       Discharge Date:   2019    Service:   [facility] Internal Medicine blue team  Attending Physician(s):         Senior Resident(s):     Grzegorz Resident(s):     PCP: Avi Harrell M.D. (Inactive)      Primary Diagnosis:   Acute congestive heart failure with reduced ejection fraction.    Secondary Diagnoses:                Principal Problem:    Shortness of breath POA: Unknown  Active Problems:    Congestive heart failure (CHF) (HCC) POA: Unknown    Chest pain POA: Unknown  Resolved Problems:    * No resolved hospital problems. *      Hospital Summary (Brief Narrative):       Mr.Kenneth Jorge Alberto Resendez is a 49-year-old gentleman with known history of hypertension, CAD S/P PCI with 3 WILBERTO to dissected RCA in 2018, ischemic cardia myopathy with reduced ejection fraction presented to the ED of [facility] with complaints of sharp intermittent chest pain for the last 2 to 3 days associated with worsening shortness of breath even at rest and orthopnea.  He was evaluated in the ED and was found to have a troponin I of 0.07 with elevated BNP of 415, chest x-ray showing mild left lower base atelectasis and EKG changes with T wave inversion in V4 to V6 and aVL.  Patient was transferred to the [facility] for further evaluation and higher level of care.  In the ED patient was found to be hemodynamically stable, troponin T-32, EKG revealing sinus rhythm with possible old inferior infarct and a borderline prolonged QT interval.  Patient was admitted for further evaluation of chest pain and ACS rule out.  Patient was admitted to telemetry, resumed his medications aspirin, atorvastatin, carvedilol, lisinopril, spironolactone and he mentioned he takes  40 mg of Lasix at home.  Considering his symptoms and examination findings of dyspnea with bilateral basal crackles and orthopnea and previous history of admissions for acute exacerbations of congestive heart failure was started IV diuresis with furosemide.  Patient mention he underwent a cardiac catheterization recently in [place], we obtained the records of catheterization from [place] which revealed significant LV dysfunction with ejection fraction of 35 to 40% with global hypokinesia.  We obtained a transthoracic echocardiogram which revealed significant LV systolic dysfunction with ejection fraction of 15 to 20% with global hypokinesia.  Patient symptoms improved as anticipated with IV diuresis and patient mentioned he feels back to baseline.  Upon examination, his breath sounds improved significantly compared to the day of hospitalization with very minimal bilateral basilar crackles and plan for safe discharge with recommended follow-up with PCP and cardiology with labs.  Patient was counseled regarding the dietary modifications, lifestyle modifications and compliance with medications.  Explained to patient about diuretics and the possible cause of acute kidney injury in cases of overdiuresis, counseled to monitor his weight changes and bring it to the notice of the primary care physician.  Patient was discharged with a prescription of furosemide 40 mg once a day.    Patient /Hospital Summary (Details -- Problem Oriented) :          Congestive heart failure (CHF) ([facility])  Assessment & Plan  Known history of coronary artery disease status post PCI and 3 WILBERTO in RCA.  Patient underwent catheterization 2 months ago in [place] which revealed ischemic heart disease with inferior hypokinesis and ejection fraction of 40%.  -Echo today revealed severe LV dysfunction with ejection fraction of 20% and global hypokinesia.  -Continue aspirin, lisinopril, statin, carvedilol, spironolactone.  -Improved clinically with IV  diuresis.  -Patient counseled regarding lifestyle modifications, dietary modifications and compliance to medication and a regular follow-up.  -We will plan for safe discharge with with furosemide 40 mg p.o. and recommendations to follow-up with primary care physician and cardiology.    Chest pain  Assessment & Plan  Prior history of CAD s/p PCI  Troponin T - 32 ->33  EKG shows T wave inversions in V4-V6 & AVL, similar to previous EKG findings.  - Telemetry  -Chest pain improved since hospitalization.        * Shortness of breath  Assessment & Plan  Improved but still orthopnea present  -H/O ischemic cardiomyopathy  -Likely due to acute exacerbation of congestive heart failure  [facility]  No JVD, but mild pitting edema  - Pulse oximetry  - I&O  - Resume home meds(aldactone, coreg, lasix)  - Correct K and Mg as needed          Consultants:     None    Procedures:        None    Imaging/ Testing:      EC-ECHOCARDIOGRAM COMPLETE W/ CONT   Final Result            Discharge Medications:         Medication Reconciliation: Completed       Medication List      START taking these medications      Instructions   furosemide 40 MG Tabs  Commonly known as:  LASIX   Take 1 Tab by mouth every day.  Dose:  40 mg        CHANGE how you take these medications      Instructions   atorvastatin 40 MG Tabs  What changed:    · medication strength  · how much to take  Commonly known as:  LIPITOR   Take 1 Tab by mouth every evening.  Dose:  40 mg     carvedilol 12.5 MG Tabs  What changed:    · medication strength  · how much to take  Commonly known as:  COREG   Take 1 Tab by mouth 2 times a day, with meals.  Dose:  12.5 mg     lisinopril 10 MG Tabs  What changed:  Another medication with the same name was removed. Continue taking this medication, and follow the directions you see here.  Commonly known as:  PRINIVIL   Take 10 mg by mouth every day.  Dose:  10 mg        CONTINUE taking these medications      Instructions   aspirin 81  MG Chew chewable tablet  Commonly known as:  ASA   Take 81 mg by mouth every day.  Dose:  81 mg     clopidogrel 75 MG Tabs  Commonly known as:  PLAVIX   Take 75 mg by mouth every day.  Dose:  75 mg     spironolactone 25 MG Tabs  Commonly known as:  ALDACTONE   Take 25 mg by mouth every day.  Dose:  25 mg        STOP taking these medications    potassium Chloride ER 20 MEQ Tbcr tablet  Commonly known as:  K-TAB            Can use .DISCHARGEMEDSLIST if going to another facility         Disposition:   Home    Diet:   Cardiac diet    Activity:   As tolerated    Instructions:      Recommended follow-up with primary care physician and cardiology with labs.  The patient was instructed to return to the ER in the event of worsening symptoms. I have counseled the patient on the importance of compliance and the patient has agreed to proceed with all medical recommendations and follow up plan indicated above.   The patient understands that all medications come with benefits and risks. Risks may include permanent injury or death and these risks can be minimized with close reassessment and monitoring.        Primary Care Provider: Patient recently moved to [place] from [facility] and in the process of establishing care with [facility].  Patient called the hospital to schedule an appointment this morning and expecting call back regarding appointment.    Discharge summary faxed to primary care provider:  Completed  Copy of discharge summary given to the patient: Deferred      Follow up appointment details :      Future Appointments   Date Time Provider Department Center   12/10/2019  9:00 AM Guy Godinez M.D. CB None     [facility]  [address] [address]  [phone]    Go on 11/26/2019  Please walk in at 8AM to schedule an appointment with your primary care provider and to get a referral to cardiology. Thank you.        Pending Studies:        None    Time spent on discharge day  patient visit, preparing discharge paperwork and arranging for patient follow up.    Summary of follow up issues:   Medical management for congestive heart failure, CHEM panel in context of diuresis    Discharge Time (Minutes) :    45min  Hospital Course Type:  Inpatient Stay >2 midnights      Condition on Discharge    ______________________________________________________________________    Interval history/exam for day of discharge:    Please see the daily progress note on the day of discharge for further details.      Most Recent Labs:    Lab Results   Component Value Date/Time    WBC 11.0 (H) 11/22/2019 05:31 AM    RBC 6.22 (H) 11/22/2019 05:31 AM    HEMOGLOBIN 18.9 (H) 11/22/2019 05:31 AM    HEMATOCRIT 56.1 (H) 11/22/2019 05:31 AM    MCV 90.2 11/22/2019 05:31 AM    MCH 30.4 11/22/2019 05:31 AM    MCHC 33.7 11/22/2019 05:31 AM    MPV 9.9 11/22/2019 05:31 AM    NEUTSPOLYS 67.20 11/22/2019 05:31 AM    LYMPHOCYTES 23.30 11/22/2019 05:31 AM    MONOCYTES 7.20 11/22/2019 05:31 AM    EOSINOPHILS 1.30 11/22/2019 05:31 AM    BASOPHILS 0.50 11/22/2019 05:31 AM      Lab Results   Component Value Date/Time    SODIUM 139 11/22/2019 05:31 AM    POTASSIUM 4.2 11/22/2019 05:31 AM    CHLORIDE 104 11/22/2019 05:31 AM    CO2 22 11/22/2019 05:31 AM    GLUCOSE 121 (H) 11/22/2019 05:31 AM    BUN 18 11/22/2019 05:31 AM    CREATININE 1.11 11/22/2019 05:31 AM    CREATININE 1.0 05/30/2006 08:57 AM      Lab Results   Component Value Date/Time    ALTSGPT 26 11/22/2019 05:31 AM    ASTSGOT 20 11/22/2019 05:31 AM    ALKPHOSPHAT 128 (H) 11/22/2019 05:31 AM    TBILIRUBIN 0.6 11/22/2019 05:31 AM    ALBUMIN 4.6 11/22/2019 05:31 AM    GLOBULIN 2.6 11/22/2019 05:31 AM     No results found for: PROTHROMBTM, INR

## 2019-11-22 NOTE — CARE PLAN
Problem: Knowledge Deficit  Goal: Knowledge of disease process/condition, treatment plan, diagnostic tests, and medications will improve  Outcome: PROGRESSING AS EXPECTED   Patient is educated of disease process and condition. Treatment team has included patient in plan of care. All medications indications and side effects are explained. Patient is encouraged to ask questions. Patient indicates understanding.     Problem: Discharge Barriers/Planning  Goal: Patient's continuum of care needs will be met  Outcome: PROGRESSING AS EXPECTED   Patient discharge needs are assessed to identify potential barriers. Patient encouraged to participate in patient goals and discharge. Proper interdisciplinary teams collaborated with. Patient actively involved in care.

## 2019-11-22 NOTE — ASSESSMENT & PLAN NOTE
Known history of coronary artery disease status post PCI and 3 WILBERTO in RCA.  Patient underwent catheterization 2 months ago in [place] which revealed ischemic heart disease with inferior hypokinesis and ejection fraction of 40%.  -Echo today revealed severe LV dysfunction with ejection fraction of 20% and global hypokinesia.  -Continue aspirin, lisinopril, statin, carvedilol, spironolactone.  -Improved clinically with IV diuresis.  -Patient counseled regarding lifestyle modifications, dietary modifications and compliance to medication and a regular follow-up.  -We will plan for safe discharge with with furosemide 40 mg p.o. and recommendations to follow-up with primary care physician and cardiology.

## 2019-11-27 ENCOUNTER — TELEPHONE (OUTPATIENT)
Dept: CARDIOLOGY | Facility: MEDICAL CENTER | Age: 49
End: 2019-11-27

## 2019-11-27 NOTE — TELEPHONE ENCOUNTER
Anisha,       Received a message from the answering service today. Beatriz at the [facility] said the patient was discharged recently from the hospital. He has an appt with Dr. Godinez on 12/10, she would like to discuss the appt. She can be reached at [phone].

## 2019-12-03 ENCOUNTER — TELEPHONE (OUTPATIENT)
Dept: CARDIOLOGY | Facility: MEDICAL CENTER | Age: 49
End: 2019-12-03

## 2019-12-10 ENCOUNTER — TELEPHONE (OUTPATIENT)
Dept: CARDIOLOGY | Facility: MEDICAL CENTER | Age: 49
End: 2019-12-10

## 2019-12-10 ENCOUNTER — OFFICE VISIT (OUTPATIENT)
Dept: CARDIOLOGY | Facility: MEDICAL CENTER | Age: 49
End: 2019-12-10
Payer: COMMERCIAL

## 2019-12-10 VITALS
WEIGHT: 299.05 LBS | BODY MASS INDEX: 38.38 KG/M2 | HEART RATE: 52 BPM | DIASTOLIC BLOOD PRESSURE: 110 MMHG | HEIGHT: 74 IN | OXYGEN SATURATION: 93 % | SYSTOLIC BLOOD PRESSURE: 152 MMHG

## 2019-12-10 DIAGNOSIS — Z98.61 CAD S/P PERCUTANEOUS CORONARY ANGIOPLASTY: ICD-10-CM

## 2019-12-10 DIAGNOSIS — I25.5 ISCHEMIC CARDIOMYOPATHY: ICD-10-CM

## 2019-12-10 DIAGNOSIS — I25.10 CAD S/P PERCUTANEOUS CORONARY ANGIOPLASTY: ICD-10-CM

## 2019-12-10 DIAGNOSIS — I10 ESSENTIAL HYPERTENSION: ICD-10-CM

## 2019-12-10 DIAGNOSIS — F15.11 HISTORY OF METHAMPHETAMINE ABUSE (HCC): ICD-10-CM

## 2019-12-10 PROBLEM — F11.21 HISTORY OF NARCOTIC ADDICTION (HCC): Status: ACTIVE | Noted: 2018-04-20

## 2019-12-10 PROCEDURE — 99205 OFFICE O/P NEW HI 60 MIN: CPT | Performed by: INTERNAL MEDICINE

## 2019-12-10 RX ORDER — LISINOPRIL 40 MG/1
10 TABLET ORAL DAILY
Qty: 90 TAB | Refills: 3 | Status: ON HOLD | OUTPATIENT
Start: 2019-12-10 | End: 2019-12-17

## 2019-12-10 SDOH — HEALTH STABILITY: MENTAL HEALTH: HOW OFTEN DO YOU HAVE A DRINK CONTAINING ALCOHOL?: NEVER

## 2019-12-10 NOTE — PROGRESS NOTES
CARDIOLOGY NEW PATIENT CONSULTATION    PCP: Sav Milligna M.D.    1. CAD S/P percutaneous coronary angioplasty  PCI to RCA in 2018 after presenting with CHF, possibly a non-STEMI.  It is unclear what the status of the other coronary arteries are, or if the PCI was due to atherosclerotic or iatrogenic problems.  He is not having angina but is having increased shortness of breath with exertion  - Continue aspirin Plavix, carvedilol, statin    2. Ischemic cardiomyopathy  LVEF troy initially 15%, subsequently fully recovered as of 5/2018.  LVEF now declined again to 20%.  Possibly recurrent ischemia, but recurrent drug use is also a possibility.  He did have a cardiac MRI in [place], though there was no contrast and so differentiating ischemic from nonischemic etiologies has not possible  - Heart catheterization with possible PCI  - Continue carvedilol, spironolactone.  Lisinopril increased to 40 mg.  Repeat labs before angiogram  - May need to consider ICD pending the clinical course    3. Essential hypertension  Poorly controlled.  I increased lisinopril and continued the other antihypertensive agents    4. History of methamphetamine abuse (HCC)  Last drug use around January/February 2019.  He is making that efforts to remain abstinent and recognizes the ongoing relapse risk.    5.  Active tobacco abuse  - As always I recommend abstinence from tobacco but deferred counseling to a later date    6.  Mild mitral and aortic insufficiency    Follow up with Guy Godinez M.D. in 3 months    Chief Complaint   Patient presents with   • Cardiomyopathy (Ischemic)       History: Isiah Resendez is a 49 y.o. male with a past medical history of hypertension, active tobacco use, coronary artery disease and ischemic cardiomyopathy presenting to hospitals with a local cardiologist.  He has a history of methamphetamine and other drug use and in 2018 presented to the hospital in [place] with lower extremity edema and  "congestion.  During his course he underwent cardiac catheterization with resultant stenting of the right coronary artery.  The records are unclear surrounding the details but there may have been an iatrogenic dissection.  Following the procedure the patient developed cardiogenic shock and was transferred to the [facility].  Following this hospital stay he was readmitted with necrotizing fasciitis around the catheter insertion site which again resulted in a prolonged hospital stay.  He had been followed to the [facility] system the LVEF troy was around 15% but normalized as of May 2018.  He had been off of drugs for some time but around January 2019 he fell on hard times and relapsed back into drug use.  He made efforts to improve his situation and relocated to [place] to be closer to family.  He has been off of drugs since February.  He believes his overall condition is improving but in November he was hospitalized for 2 days after presenting with shortness of breath.  He was treated for congestive heart failure and has been released.  He now continues to find it difficult to exert himself due to dyspnea.  He restricts his sodium intake and uses Lasix as needed.  Average home blood pressures are 140/80.    ROS:  All other systems reviewed and negative except as per the HPI    PE:  /110 (BP Location: Right arm, Patient Position: Sitting, BP Cuff Size: Adult)   Pulse (!) 52   Ht 1.88 m (6' 2\")   Wt (!) 135.6 kg (299 lb 0.9 oz)   SpO2 93%   BMI 38.40 kg/m²   GEN: Well appearing, but poorly kept  HEENT: Symmetric face. Anicteric sclerae. Moist mucus membranes  NECK: No JVD. No lymphadenopathy  CARDIAC: Normal PMI, regular, S1 is soft, S2 is normal  VASCULATURE: Normal carotid amplitude without bruit.   RESP: Clear to auscultation bilaterally  ABD: Soft, non-tender, non-distended  EXT: No edema, no clubbing or cyanosis  SKIN: Warm and dry  NEURO: No gross deficits  PSYCH: Appropriate affect, " participates in conversation    Past Medical History:   Diagnosis Date   • Chronic pain    • Cold 1/1/15    head cold   • HTN (hypertension)    • Renal disorder     hx of kidney stones   • Urinary bladder disorder     bladder stone     Past Surgical History:   Procedure Laterality Date   • CYSTOSCOPY  1/13/2015    Performed by Eugenio Vargas M.D. at SURGERY [facility]   • LASERTRIPSY  1/13/2015    Performed by Eugenio Vargas M.D. at SURGERY [facility]   • LITHOTRIPSY  2009    kidney stones   • OTHER ORTHOPEDIC SURGERY  2001     R tibial plateu fracture   • OPEN REDUCTION     • OTHER      ORIF R elbow   • TONSILLECTOMY AND ADENOIDECTOMY       No Known Allergies  Outpatient Encounter Medications as of 12/10/2019   Medication Sig Dispense Refill   • lisinopril (PRINIVIL) 40 MG tablet Take 0.5 Tabs by mouth every day. 90 Tab 3   • atorvastatin (LIPITOR) 40 MG Tab Take 1 Tab by mouth every evening. 30 Tab 2   • carvedilol (COREG) 12.5 MG Tab Take 1 Tab by mouth 2 times a day, with meals. 60 Tab 2   • furosemide (LASIX) 40 MG Tab Take 1 Tab by mouth every day. 60 Tab 2   • aspirin (ASA) 81 MG Chew Tab chewable tablet Take 81 mg by mouth every day.     • spironolactone (ALDACTONE) 25 MG Tab Take 25 mg by mouth every day.     • clopidogrel (PLAVIX) 75 MG Tab Take 75 mg by mouth every day.     • [DISCONTINUED] lisinopril (PRINIVIL) 10 MG Tab Take 10 mg by mouth every day.       No facility-administered encounter medications on file as of 12/10/2019.      Social History     Socioeconomic History   • Marital status: Single     Spouse name: Not on file   • Number of children: Not on file   • Years of education: Not on file   • Highest education level: Not on file   Occupational History   • Occupation:      Employer: [employer]   Social Needs   • Financial resource strain: Not on file   • Food insecurity:     Worry: Not on file     Inability: Not on file   • Transportation needs:      Medical: Not on file     Non-medical: Not on file   Tobacco Use   • Smoking status: Current Every Day Smoker     Packs/day: 1.00     Years: 25.00     Pack years: 25.00     Types: Cigarettes   • Smokeless tobacco: Never Used   Substance and Sexual Activity   • Alcohol use: Never     Frequency: Never   • Drug use: No     Comment: experimented drugs in [place]   • Sexual activity: Yes     Partners: Female   Lifestyle   • Physical activity:     Days per week: Not on file     Minutes per session: Not on file   • Stress: Not on file   Relationships   • Social connections:     Talks on phone: Not on file     Gets together: Not on file     Attends [employer] service: Not on file     Active member of club or organization: Not on file     Attends meetings of clubs or organizations: Not on file     Relationship status: Not on file   • Intimate partner violence:     Fear of current or ex partner: Not on file     Emotionally abused: Not on file     Physically abused: Not on file     Forced sexual activity: Not on file   Other Topics Concern   • Not on file   Social History Narrative   • Not on file         Family History   Problem Relation Age of Onset   • Other Mother         post polio   • Hypertension Mother    • Hyperlipidemia Mother    • Hyperlipidemia Father          Studies  Lab Results   Component Value Date/Time    CHOLSTRLTOT 136 11/21/2019 12:54 AM    LDL 70 11/21/2019 12:54 AM    HDL 42 11/21/2019 12:54 AM    TRIGLYCERIDE 122 11/21/2019 12:54 AM       Lab Results   Component Value Date/Time    SODIUM 139 11/22/2019 05:31 AM    POTASSIUM 4.2 11/22/2019 05:31 AM    CHLORIDE 104 11/22/2019 05:31 AM    CO2 22 11/22/2019 05:31 AM    GLUCOSE 121 (H) 11/22/2019 05:31 AM    BUN 18 11/22/2019 05:31 AM    CREATININE 1.11 11/22/2019 05:31 AM    CREATININE 1.0 05/30/2006 08:57 AM     Lab Results   Component Value Date/Time    ALKPHOSPHAT 128 (H) 11/22/2019 05:31 AM    ASTSGOT 20 11/22/2019 05:31 AM    ALTSGPT 26 11/22/2019 05:31 AM     TBILIRUBIN 0.6 11/22/2019 05:31 AM        For this encounter I directly reviewed ECG tracings and medical records I agree with the interpretations in the electronic health record

## 2019-12-10 NOTE — TELEPHONE ENCOUNTER
Patient is scheduled on 12-17-19 for a [facility] w/pos with Dr. Godinez. Patient was told to hold lasix AM day of procedure and to check in at 7:30 for a 9:30 procedure. H&P was done on 12-10-19 by Dr. Godinez. Pre admit to call patient due to him living out of area.

## 2019-12-17 ENCOUNTER — TELEPHONE (OUTPATIENT)
Dept: CARDIOLOGY | Facility: MEDICAL CENTER | Age: 49
End: 2019-12-17

## 2019-12-17 ENCOUNTER — APPOINTMENT (OUTPATIENT)
Dept: CARDIOLOGY | Facility: MEDICAL CENTER | Age: 49
End: 2019-12-17
Attending: INTERNAL MEDICINE
Payer: COMMERCIAL

## 2019-12-17 ENCOUNTER — HOSPITAL ENCOUNTER (OUTPATIENT)
Facility: MEDICAL CENTER | Age: 49
End: 2019-12-17
Attending: INTERNAL MEDICINE | Admitting: INTERNAL MEDICINE
Payer: COMMERCIAL

## 2019-12-17 VITALS
BODY MASS INDEX: 37.97 KG/M2 | WEIGHT: 295.86 LBS | DIASTOLIC BLOOD PRESSURE: 77 MMHG | RESPIRATION RATE: 18 BRPM | HEART RATE: 85 BPM | SYSTOLIC BLOOD PRESSURE: 125 MMHG | OXYGEN SATURATION: 45 % | TEMPERATURE: 97.8 F | HEIGHT: 74 IN

## 2019-12-17 DIAGNOSIS — I10 ESSENTIAL HYPERTENSION: ICD-10-CM

## 2019-12-17 DIAGNOSIS — I50.82 BIVENTRICULAR CONGESTIVE HEART FAILURE (HCC): ICD-10-CM

## 2019-12-17 DIAGNOSIS — Z98.61 CAD S/P PERCUTANEOUS CORONARY ANGIOPLASTY: ICD-10-CM

## 2019-12-17 DIAGNOSIS — I25.5 ISCHEMIC CARDIOMYOPATHY: ICD-10-CM

## 2019-12-17 DIAGNOSIS — I25.10 CAD S/P PERCUTANEOUS CORONARY ANGIOPLASTY: ICD-10-CM

## 2019-12-17 LAB
ALBUMIN SERPL BCP-MCNC: 4.4 G/DL (ref 3.2–4.9)
ALBUMIN/GLOB SERPL: 1.6 G/DL
ALP SERPL-CCNC: 109 U/L (ref 30–99)
ALT SERPL-CCNC: 31 U/L (ref 2–50)
ANION GAP SERPL CALC-SCNC: 9 MMOL/L (ref 0–11.9)
APTT PPP: 32.2 SEC (ref 24.7–36)
AST SERPL-CCNC: 17 U/L (ref 12–45)
BILIRUB SERPL-MCNC: 0.5 MG/DL (ref 0.1–1.5)
BUN SERPL-MCNC: 15 MG/DL (ref 8–22)
CALCIUM SERPL-MCNC: 9.4 MG/DL (ref 8.5–10.5)
CHLORIDE SERPL-SCNC: 106 MMOL/L (ref 96–112)
CO2 SERPL-SCNC: 26 MMOL/L (ref 20–33)
CREAT SERPL-MCNC: 0.93 MG/DL (ref 0.5–1.4)
EKG IMPRESSION: NORMAL
ERYTHROCYTE [DISTWIDTH] IN BLOOD BY AUTOMATED COUNT: 48.3 FL (ref 35.9–50)
GLOBULIN SER CALC-MCNC: 2.8 G/DL (ref 1.9–3.5)
GLUCOSE SERPL-MCNC: 111 MG/DL (ref 65–99)
HCT VFR BLD AUTO: 52.1 % (ref 42–52)
HGB BLD-MCNC: 17.2 G/DL (ref 14–18)
INR PPP: 1.03 (ref 0.87–1.13)
MCH RBC QN AUTO: 30 PG (ref 27–33)
MCHC RBC AUTO-ENTMCNC: 33 G/DL (ref 33.7–35.3)
MCV RBC AUTO: 90.8 FL (ref 81.4–97.8)
PLATELET # BLD AUTO: 266 K/UL (ref 164–446)
PMV BLD AUTO: 9.9 FL (ref 9–12.9)
POTASSIUM SERPL-SCNC: 4.1 MMOL/L (ref 3.6–5.5)
PROT SERPL-MCNC: 7.2 G/DL (ref 6–8.2)
PROTHROMBIN TIME: 13.8 SEC (ref 12–14.6)
RBC # BLD AUTO: 5.74 M/UL (ref 4.7–6.1)
SODIUM SERPL-SCNC: 141 MMOL/L (ref 135–145)
WBC # BLD AUTO: 11.7 K/UL (ref 4.8–10.8)

## 2019-12-17 PROCEDURE — 85730 THROMBOPLASTIN TIME PARTIAL: CPT

## 2019-12-17 PROCEDURE — 700105 HCHG RX REV CODE 258: Performed by: INTERNAL MEDICINE

## 2019-12-17 PROCEDURE — 93458 L HRT ARTERY/VENTRICLE ANGIO: CPT | Mod: 26 | Performed by: INTERNAL MEDICINE

## 2019-12-17 PROCEDURE — 700111 HCHG RX REV CODE 636 W/ 250 OVERRIDE (IP)

## 2019-12-17 PROCEDURE — 700101 HCHG RX REV CODE 250

## 2019-12-17 PROCEDURE — 360979 CL-LEFT HEART CATHETERIZATION WITH POSSIBLE INTERVENTION

## 2019-12-17 PROCEDURE — 99152 MOD SED SAME PHYS/QHP 5/>YRS: CPT | Performed by: INTERNAL MEDICINE

## 2019-12-17 PROCEDURE — 700117 HCHG RX CONTRAST REV CODE 255: Performed by: INTERNAL MEDICINE

## 2019-12-17 PROCEDURE — 160002 HCHG RECOVERY MINUTES (STAT)

## 2019-12-17 PROCEDURE — 85027 COMPLETE CBC AUTOMATED: CPT

## 2019-12-17 PROCEDURE — 700102 HCHG RX REV CODE 250 W/ 637 OVERRIDE(OP)

## 2019-12-17 PROCEDURE — 80053 COMPREHEN METABOLIC PANEL: CPT

## 2019-12-17 PROCEDURE — 85610 PROTHROMBIN TIME: CPT

## 2019-12-17 PROCEDURE — A9270 NON-COVERED ITEM OR SERVICE: HCPCS

## 2019-12-17 PROCEDURE — 93010 ELECTROCARDIOGRAM REPORT: CPT | Performed by: INTERNAL MEDICINE

## 2019-12-17 PROCEDURE — 93005 ELECTROCARDIOGRAM TRACING: CPT | Performed by: INTERNAL MEDICINE

## 2019-12-17 RX ORDER — LIDOCAINE HYDROCHLORIDE 20 MG/ML
INJECTION, SOLUTION INFILTRATION; PERINEURAL
Status: COMPLETED
Start: 2019-12-17 | End: 2019-12-17

## 2019-12-17 RX ORDER — ASPIRIN 81 MG/1
TABLET, CHEWABLE ORAL
Status: COMPLETED
Start: 2019-12-17 | End: 2019-12-17

## 2019-12-17 RX ORDER — SODIUM CHLORIDE 9 MG/ML
INJECTION, SOLUTION INTRAVENOUS CONTINUOUS
Status: DISCONTINUED | OUTPATIENT
Start: 2019-12-17 | End: 2019-12-17 | Stop reason: HOSPADM

## 2019-12-17 RX ORDER — MIDAZOLAM HYDROCHLORIDE 1 MG/ML
INJECTION INTRAMUSCULAR; INTRAVENOUS
Status: COMPLETED
Start: 2019-12-17 | End: 2019-12-17

## 2019-12-17 RX ORDER — VERAPAMIL HYDROCHLORIDE 2.5 MG/ML
INJECTION, SOLUTION INTRAVENOUS
Status: COMPLETED
Start: 2019-12-17 | End: 2019-12-17

## 2019-12-17 RX ORDER — HEPARIN SODIUM 200 [USP'U]/100ML
INJECTION, SOLUTION INTRAVENOUS
Status: COMPLETED
Start: 2019-12-17 | End: 2019-12-17

## 2019-12-17 RX ORDER — CLOPIDOGREL BISULFATE 75 MG/1
TABLET ORAL
Status: COMPLETED
Start: 2019-12-17 | End: 2019-12-17

## 2019-12-17 RX ORDER — SODIUM CHLORIDE 9 MG/ML
INJECTION, SOLUTION INTRAVENOUS
Status: DISCONTINUED | OUTPATIENT
Start: 2019-12-17 | End: 2019-12-17 | Stop reason: HOSPADM

## 2019-12-17 RX ORDER — HEPARIN SODIUM,PORCINE 1000/ML
VIAL (ML) INJECTION
Status: COMPLETED
Start: 2019-12-17 | End: 2019-12-17

## 2019-12-17 RX ADMIN — NITROGLYCERIN 10 ML: 20 INJECTION INTRAVENOUS at 11:48

## 2019-12-17 RX ADMIN — VERAPAMIL HYDROCHLORIDE 5 MG: 2.5 INJECTION INTRAVENOUS at 11:53

## 2019-12-17 RX ADMIN — HEPARIN SODIUM 2000 UNITS: 200 INJECTION, SOLUTION INTRAVENOUS at 11:48

## 2019-12-17 RX ADMIN — SODIUM CHLORIDE: 9 INJECTION, SOLUTION INTRAVENOUS at 08:39

## 2019-12-17 RX ADMIN — HEPARIN SODIUM: 1000 INJECTION, SOLUTION INTRAVENOUS; SUBCUTANEOUS at 11:48

## 2019-12-17 RX ADMIN — MIDAZOLAM HYDROCHLORIDE 2 MG: 1 INJECTION, SOLUTION INTRAMUSCULAR; INTRAVENOUS at 11:49

## 2019-12-17 RX ADMIN — ASPIRIN 81 MG 81 MG: 81 TABLET ORAL at 11:28

## 2019-12-17 RX ADMIN — FENTANYL CITRATE 100 MCG: 0.05 INJECTION, SOLUTION INTRAMUSCULAR; INTRAVENOUS at 11:49

## 2019-12-17 RX ADMIN — IOHEXOL 77 ML: 350 INJECTION, SOLUTION INTRAVENOUS at 12:03

## 2019-12-17 RX ADMIN — CLOPIDOGREL BISULFATE 75 MG: 75 TABLET ORAL at 11:28

## 2019-12-17 RX ADMIN — LIDOCAINE HYDROCHLORIDE: 20 INJECTION, SOLUTION INFILTRATION; PERINEURAL at 11:48

## 2019-12-17 RX ADMIN — FENTANYL CITRATE 75 MCG: 0.05 INJECTION, SOLUTION INTRAMUSCULAR; INTRAVENOUS at 12:01

## 2019-12-17 RX ADMIN — MIDAZOLAM HYDROCHLORIDE 0.5 MG: 1 INJECTION, SOLUTION INTRAMUSCULAR; INTRAVENOUS at 12:01

## 2019-12-17 NOTE — DISCHARGE INSTRUCTIONS
ACTIVITY: Rest and take it easy for the first 24 hours.  A responsible adult is recommended to remain with you during that time.  It is normal to feel sleepy.  We encourage you to not do anything that requires balance, judgment or coordination.    MILD FLU-LIKE SYMPTOMS ARE NORMAL. YOU MAY EXPERIENCE GENERALIZED MUSCLE ACHES, THROAT IRRITATION, HEADACHE AND/OR SOME NAUSEA.    FOR 24 HOURS DO NOT:  Drive, operate machinery or run household appliances.  Drink beer or alcoholic beverages.   Make important decisions or sign legal documents.    SPECIAL INSTRUCTIONS: **KEEP INCISION DRY FOR 24 HRS*    DIET: To avoid nausea, slowly advance diet as tolerated, avoiding spicy or greasy foods for the first day.  Add more substantial food to your diet according to your physician's instructions.  Babies can be fed formula or breast milk as soon as they are hungry.  INCREASE FLUIDS AND FIBER TO AVOID CONSTIPATION.    SURGICAL DRESSING/BATHING: *MAY SHOWER TOMORROW AFTERNOON THEN MAY REMOVE DRESSING**    FOLLOW-UP APPOINTMENT:  A follow-up appointment should be arranged with your doctor in **DR WILSON   [phone]*; call to schedule.    You should CALL YOUR PHYSICIAN if you develop:  Fever greater than 101 degrees F.  Pain not relieved by medication, or persistent nausea or vomiting.  Excessive bleeding (blood soaking through dressing) or unexpected drainage from the wound.  Extreme redness or swelling around the incision site, drainage of pus or foul smelling drainage.  Inability to urinate or empty your bladder within 8 hours.  Problems with breathing or chest pain.    You should call 911 if you develop problems with breathing or chest pain.  If you are unable to contact your doctor or surgical center, you should go to the nearest emergency room or urgent care center.  Physician's telephone #: *[phone]**    If any questions arise, call your doctor.  If your doctor is not available, please feel free to call the Surgical Center at  [phone].  The Center is open Monday through Friday from 7AM to 7PM.  You can also call the HEALTH HOTLINE open 24 hours/day, 7 days/week and speak to a nurse at [phone], or toll free at [phone].    A registered nurse may call you a few days after your surgery to see how you are doing after your procedure.    MEDICATIONS: Resume taking daily medication.  Take prescribed pain medication with food.  If no medication is prescribed, you may take non-aspirin pain medication if needed.  PAIN MEDICATION CAN BE VERY CONSTIPATING.  Take a stool softener or laxative such as senokot, pericolace, or milk of magnesia if needed.      If your physician has prescribed pain medication that includes Acetaminophen (Tylenol), do not take additional Acetaminophen (Tylenol) while taking the prescribed medication.    Depression / Suicide Risk    As you are discharged from this [facility] facility, it is important to learn how to keep safe from harming yourself.    Recognize the warning signs:  · Abrupt changes in personality, positive or negative- including increase in energy   · Giving away possessions  · Change in eating patterns- significant weight changes-  positive or negative  · Change in sleeping patterns- unable to sleep or sleeping all the time   · Unwillingness or inability to communicate  · Depression  · Unusual sadness, discouragement and loneliness  · Talk of wanting to die  · Neglect of personal appearance   · Rebelliousness- reckless behavior  · Withdrawal from people/activities they love  · Confusion- inability to concentrate     If you or a loved one observes any of these behaviors or has concerns about self-harm, here's what you can do:  · Talk about it- your feelings and reasons for harming yourself  · Remove any means that you might use to hurt yourself (examples: pills, rope, extension cords, firearm)  · Get professional help from the community (Mental Health, Substance Abuse, psychological  counseling)  · Do not be alone:Call your Safe Contact- someone whom you trust who will be there for you.  · Call your local CRISIS HOTLINE 826-8501 or 701-757-9082  · Call your local Children's Mobile Crisis Response Team [place] (426) 958-1183 or www.[employer]  · Call the toll free National Suicide Prevention Hotlines   · National Suicide Prevention Lifeline 346-811-SYDU (5797)  [employer] Line Network 800-SUICIDE (680-6365)    Radial Catherization Discharge Instructions      · Do not subject hand/arm to any forceful movements for 24 hours    i.e. supporting weight when rising from the chair or bed.   · Do not drive a car for 24 hours  · You may remove the dressing tomorrow  · You may shower on the day following your procedure.  Do not take a tub bath or submerge the puncture site in water for 3 days following the procedure.  · No Lifting more than 3-5 pounds with affected wrist for 5 days  · Follow up with  **STEVE*  2-4 weeks.  · Increase fluids for 2 days post procedure.  · Continue all previous medications unless otherwise instructed.    If bleeding should occur following discharge:  · Sit down and apply firm pressure to site with your fingers for 10 minutes  · If the bleeding stops, continue to sit quietly, keeping your wrist straight for 2 hours.  Notify physician as soon as possible ( [phone])  · If bleeding does not stop after 10 minutes, or if there is a large amount of bleeding or spurting, call 911 immediately.  Do not drive yourself to the hospital.

## 2019-12-17 NOTE — TELEPHONE ENCOUNTER
Guy Godinez M.D.  Anisha Limon R.N.             I started Mr. Resendez on Entresto following the heart catheterization today.  I asked that he obtain a basic metabolic panel in 1 week and a echocardiogram in 2 months.  I would also like him to see a nurse practitioner in early January to see how Entresto is going.  The orders for the echocardiogram and laboratory studies have been placed.      To Tg for scheduling

## 2019-12-17 NOTE — OR NURSING
1213   PT RECEIVED FROM CATH LAB,  S/P CLEAN LEFT HEART CATH VIA RIGHT WRIST.  TR BAND INTACT IN RIGHT WRIST.  MOM IS @ BEDSIDE.  DENIES ANY PAIN.  DR WILSON IS HERE TALKING TO PT AND FAMILY.    1230   PT EATING SNACK AND TAKING PO FLUID WELL. TR BAND INTACT.  SITE IS CLEAR.    1315   PT RESTING QUIETLY.  TR BAND INTACT AND SITE IS CLEAR.  1ST 2CC OF AIR RELEASED FROM TR BAND.      1330  NEXT 3CC OF AIR RELEASED FROM TR BAND.  SITE IS CLEAR.    1345   NEXT 3CC OF AIR RELEASED FROM TR BAND.  SITE IS CLEAR.    1400   LAST 3CC OF AIR RELEASED FROM TR BAND.  SITE IS CLEAR.    1415   TR BAND REMOVED AND 2X2 WITH TEGADERM APPLIED.  SITE IS CLEAR.    1430   DC INSTRUCTIONS GIVEN TO PT.  VERBALIZED UNDERSTANDING OF ALL.  HL DC.  PT DC TO HOME,  AMBULATORY,  ESCORTED OUT BY RN.

## 2019-12-30 ENCOUNTER — TELEPHONE (OUTPATIENT)
Dept: CARDIOLOGY | Facility: MEDICAL CENTER | Age: 49
End: 2019-12-30

## 2019-12-30 NOTE — TELEPHONE ENCOUNTER
Ning Scott, Med Ass't  Anisha Limon R.N.             Patient would like a call back please   He needs a new RX for:   sacubitril-valsartan (ENTRESTO) 49-51 MG Tab tablet     [facility]      Pt [facility] not [facility] the Entresto because it was too expensive at [facility]. [facility] He provides fax number [phone]. Faxed sign Rx to this number. Receipt confirmed. Reminded pt that he should have labs completed 1 week after starting Entresto.

## 2020-01-01 NOTE — TELEPHONE ENCOUNTER
"[facility]. Entresto is not formulary, and they need the non-formulary request form completed and faxed back. If pt meets criteria, then they can fill. Under \"Inclusion Criteria\" it is mentioned that Entresto is \"restricted to [facility] authorized cardiologist for initial prescription.\" Called [facility] at [phone] to find out if it's even possible to fill Entresto Rx written by non [facility] provider. Pharmacist confirmed that this inclusion criteria is mostly meant so that [facility] PC[facility] aren't writing for Entresto prior to evaluation by a cardiologist. If pt meets criteria they will be able to fill Rx written by non [facility] cardiologist. Will have BE complete and sign when he is in office.  "

## 2020-01-23 ENCOUNTER — OFFICE VISIT (OUTPATIENT)
Dept: CARDIOLOGY | Facility: MEDICAL CENTER | Age: 50
End: 2020-01-23
Payer: COMMERCIAL

## 2020-01-23 VITALS
HEIGHT: 74 IN | OXYGEN SATURATION: 94 % | DIASTOLIC BLOOD PRESSURE: 80 MMHG | RESPIRATION RATE: 18 BRPM | BODY MASS INDEX: 38.86 KG/M2 | WEIGHT: 302.8 LBS | HEART RATE: 82 BPM | SYSTOLIC BLOOD PRESSURE: 120 MMHG

## 2020-01-23 DIAGNOSIS — I25.5 ISCHEMIC CARDIOMYOPATHY: ICD-10-CM

## 2020-01-23 DIAGNOSIS — Z98.61 CAD S/P PERCUTANEOUS CORONARY ANGIOPLASTY: ICD-10-CM

## 2020-01-23 DIAGNOSIS — F11.21 HISTORY OF NARCOTIC ADDICTION (HCC): ICD-10-CM

## 2020-01-23 DIAGNOSIS — F15.11 HISTORY OF METHAMPHETAMINE ABUSE (HCC): ICD-10-CM

## 2020-01-23 DIAGNOSIS — I25.10 CAD S/P PERCUTANEOUS CORONARY ANGIOPLASTY: ICD-10-CM

## 2020-01-23 PROCEDURE — 99214 OFFICE O/P EST MOD 30 MIN: CPT | Performed by: NURSE PRACTITIONER

## 2020-01-23 RX ORDER — CARVEDILOL 12.5 MG/1
12.5 TABLET ORAL 2 TIMES DAILY WITH MEALS
Qty: 180 TAB | Refills: 3 | Status: SHIPPED | OUTPATIENT
Start: 2020-01-23

## 2020-01-23 RX ORDER — SPIRONOLACTONE 25 MG/1
25 TABLET ORAL DAILY
Qty: 90 TAB | Refills: 3 | Status: SHIPPED | OUTPATIENT
Start: 2020-01-23

## 2020-01-23 ASSESSMENT — ENCOUNTER SYMPTOMS
HEADACHES: 0
DIZZINESS: 0
SHORTNESS OF BREATH: 0
FALLS: 0
WHEEZING: 0
FOCAL WEAKNESS: 0
DOUBLE VISION: 0
WEAKNESS: 0
PALPITATIONS: 0
BLURRED VISION: 0
ORTHOPNEA: 0
COUGH: 0
LOSS OF CONSCIOUSNESS: 0

## 2020-01-23 NOTE — LETTER
"     [facility]   [address], [address]  [place] [address]  Phone: [phone]  Fax: [phone]              Isiah Resendez  1970    Encounter Date: 1/23/2020    TOBY Mack          PROGRESS NOTE:  Chief Complaint   Patient presents with   • Coronary Artery Disease       Subjective:   Isiah Resendez is a 50 y.o. male who presents today after cardiac catheterization.    He is followed by Dr. Godinez in our clinic, history of CAD with PCI x3 to dissected RCA 2/2018 at [facility], hypertension, methamphetamine/opioids abuse, and cardiomyopathy with ef 20%.    Patient underwent cardiac catheterization on 12/17/2019 w which showed widely patent stents in RCA and obstructive coronary artery disease involving the terminal portion of the circumflex/OM 5.  Moderate elevation in the LV EDP.  He was changed to Entresto.  Echocardiogram pending in February for evaluation for AICD.     CAD: tolerating medication. Denies any chest pain or SOB.    HFrEF: tolerating entresto, he stated \"I feel much better since the start of the entresto\". He is able now to climb two flights of stairs without stopping. The other day he stated he was able to stack firewoods, in which he has not been able to do for 2 years now.    Hypertension; stable    Meth abuse: last drug abuse has been a year now    Active tobacco abuse     Past Medical History:   Diagnosis Date   • Chronic pain    • Cold 1/1/15    head cold   • HTN (hypertension)    • Renal disorder     hx of kidney stones   • Urinary bladder disorder     bladder stone     Past Surgical History:   Procedure Laterality Date   • CYSTOSCOPY  1/13/2015    Performed by Eugenio Vargas M.D. at SURGERY [facility]   • LASERTRIPSY  1/13/2015    Performed by Eugenio Vargas M.D. at SURGERY [facility]   • LITHOTRIPSY  2009    kidney stones   • OTHER ORTHOPEDIC SURGERY  2001     R tibial " plateu fracture   • OPEN REDUCTION     • OTHER      ORIF R elbow   • TONSILLECTOMY AND ADENOIDECTOMY       Family History   Problem Relation Age of Onset   • Other Mother         post polio   • Hypertension Mother    • Hyperlipidemia Mother    • Hyperlipidemia Father      Social History     Socioeconomic History   • Marital status: Single     Spouse name: Not on file   • Number of children: Not on file   • Years of education: Not on file   • Highest education level: Not on file   Occupational History   • Occupation:      Employer: [employer]   Social Needs   • Financial resource strain: Not on file   • Food insecurity:     Worry: Not on file     Inability: Not on file   • Transportation needs:     Medical: Not on file     Non-medical: Not on file   Tobacco Use   • Smoking status: Current Every Day Smoker     Packs/day: 1.00     Years: 25.00     Pack years: 25.00     Types: Cigarettes   • Smokeless tobacco: Never Used   Substance and Sexual Activity   • Alcohol use: Never     Frequency: Never   • Drug use: No     Types: Methamphetamines     Comment: quit 1 year ago   • Sexual activity: Yes     Partners: Female   Lifestyle   • Physical activity:     Days per week: Not on file     Minutes per session: Not on file   • Stress: Not on file   Relationships   • Social connections:     Talks on phone: Not on file     Gets together: Not on file     Attends Jew service: Not on file     Active member of club or organization: Not on file     Attends meetings of clubs or organizations: Not on file     Relationship status: Not on file   • Intimate partner violence:     Fear of current or ex partner: Not on file     Emotionally abused: Not on file     Physically abused: Not on file     Forced sexual activity: Not on file   Other Topics Concern   • Not on file   Social History Narrative   • Not on file     No Known Allergies  Outpatient Encounter Medications as of 1/23/2020   Medication Sig Dispense Refill    "  • spironolactone (ALDACTONE) 25 MG Tab Take 1 Tab by mouth every day. 90 Tab 3   • carvedilol (COREG) 12.5 MG Tab Take 1 Tab by mouth 2 times a day, with meals. 180 Tab 3   • sacubitril-valsartan (ENTRESTO) 49-51 MG Tab tablet Take 1 Tab by mouth 2 Times a Day. 180 Tab 3   • atorvastatin (LIPITOR) 40 MG Tab Take 1 Tab by mouth every evening. 30 Tab 2   • furosemide (LASIX) 40 MG Tab Take 1 Tab by mouth every day. 60 Tab 2   • aspirin (ASA) 81 MG Chew Tab chewable tablet Take 81 mg by mouth every day.     • [DISCONTINUED] carvedilol (COREG) 12.5 MG Tab Take 1 Tab by mouth 2 times a day, with meals. 60 Tab 2   • [DISCONTINUED] spironolactone (ALDACTONE) 25 MG Tab Take 25 mg by mouth every day.       No facility-administered encounter medications on file as of 1/23/2020.      Review of Systems   Constitutional: Negative for malaise/fatigue.   Eyes: Negative for blurred vision and double vision.   Respiratory: Negative for cough, shortness of breath and wheezing.    Cardiovascular: Negative for chest pain, palpitations, orthopnea and leg swelling.   Musculoskeletal: Negative for falls.   Neurological: Negative for dizziness, focal weakness, loss of consciousness, weakness and headaches.   All other systems reviewed and are negative.       Objective:   /80 (BP Location: Left arm, Patient Position: Sitting, BP Cuff Size: Adult)   Pulse 82   Resp 18   Ht 1.88 m (6' 2\")   Wt (!) 137.3 kg (302 lb 12.8 oz)   SpO2 94%   BMI 38.88 kg/m²      Physical Exam   Constitutional: He is oriented to person, place, and time. He appears well-developed and well-nourished. No distress.   HENT:   Head: Normocephalic and atraumatic.   Eyes: Pupils are equal, round, and reactive to light.   Neck: No JVD present.   Cardiovascular: Normal rate, regular rhythm and normal heart sounds.   Pulmonary/Chest: Effort normal and breath sounds normal.   Abdominal: Soft. Bowel sounds are normal. He exhibits no distension.   Musculoskeletal:   "       General: No edema.   Neurological: He is alert and oriented to person, place, and time.   Skin: Skin is warm and dry. He is not diaphoretic.   Psychiatric: He has a normal mood and affect. His behavior is normal. Judgment and thought content normal.         Coronary angiogram  12/17/2019  Conclusions    1. Nonischemic cardiomyopathy.    2. Widely patent stents in the right coronary artery.    3. Obstructive coronary artery disease involving the terminal portion of the  circumflex/OM 5.    4. Moderate elevation in left ventricular end-diastolic pressure.    5. Severe reduction in LVEF.    6. Narrowed pulse pressure.     Post-Procedure Diagnosis    Nonischemic cardiomyopathy.     Recommendations    * Change lisinopril to Entresto.  Basic metabolic panel in 2 weeks time.  Follow-up with WENDIE in 2 to 3 weeks.  Echocardiogram in February to assess  candidacy for ICD.      ECHO  11/21/2019  Left Ventricle  Contrast was used to enhance visualization of the endocardial border.   Existing IV was used. 3 ML of contrast was administered. Left ventricle   is moderately dilated. Normal left ventricular wall thickness. Severely   reduced left ventricular systolic function. Left ventricular ejection   fraction is visually estimated to be 15-global hypokinesis. Grade II   diastolic dysfunction. 20%.     Right Ventricle  Normal right ventricular size. Reduced right ventricular systolic   function.     Right Atrium  Normal right atrial size. Normal inferior vena cava size and   inspiratory collapse.     Left Atrium  Normal left atrial size. Left atrial volume index is 28 mL/sq m.     Mitral Valve  Structurally normal mitral valve. No mitral stenosis. Mild mitral   regurgitation.     Aortic Valve  Structurally normal aortic valve. No aortic stenosis. Mild aortic   insufficiency.     Tricuspid Valve  Structurally normal tricuspid valve without significant stenosis or   regurgitation.  Unable to estimate pulmonary artery pressure  due to an   inadequate tricuspid regurgitant jet.     Pulmonic Valve  Structurally normal pulmonic valve without significant stenosis or   regurgitation.     Pericardium  Normal pericardium without effusion.     Aorta  The aortic root is normal.  Ascending aorta is dilated with a diameter   of  3.7 cm.    ECHO   5/2018    1. Normal left ventricular systolic function, with an ejection fraction of 59 %.   2. Upper normal LV wall thickness.   3. Mild aortic valve regurgitation.   4. The aortic root is mildly enlarged.   5. Compared to prior echocardiogram from 3/11/2018, left ventricular chamber size has decreased and left ventricular ejection fraction has increased significantly.   6. Sinus rhythm was present during the exam.    Lab Results   Component Value Date/Time    SODIUM 141 12/17/2019 08:45 AM    POTASSIUM 4.1 12/17/2019 08:45 AM    CHLORIDE 106 12/17/2019 08:45 AM    CO2 26 12/17/2019 08:45 AM    GLUCOSE 111 (H) 12/17/2019 08:45 AM    BUN 15 12/17/2019 08:45 AM    CREATININE 0.93 12/17/2019 08:45 AM    CREATININE 1.0 05/30/2006 08:57 AM        Assessment:     1. CAD S/P percutaneous coronary angioplasty     2. Ischemic cardiomyopathy     3. History of methamphetamine abuse (HCC)     4. History of narcotic addiction ([facility])         Medical Decision Making:  Today's Assessment / Status / Plan:       1. CAD;  - denies any chest pain or SOB  - cath site is clean, soft and good peripheral pulse on the right radial   -Continue aspirin 81 mg, atorvastatin 40 mg, and Coreg 12.5 mg twice daily    2.  Heart failure with reduced EF:  nonischemic cardiomyopathy, substance abuse  - Today, based on physical examination findings, patient is euvolemic. No JVD, lungs are clear to auscultation, no pitting edema in bilateral lower extremities, no ascites.   -Continue Coreg 12.5 mg twice daily, Lasix 40 mg daily, Entresto 49-51 mg twice daily, and Aldactone 25 mg daily  - Reinforced s/sx of worsening heart failure with patient and  weight monitoring. Pt verbalizes understanding. Pt to call office or RTC if present.  - repeat ECHO in February    3. Tobacco abuse:  - discussed     Follow up in 2 months with ALFREDITO Allen prior to appt.       Collaborating Provider: Dr. Martha Milligan M.D.  [address] [place] [address]  VIA Facsimile: [phone]     Guy Godinez M.D.  [address] [place] [address] [place] [address]  VIA In Basket

## 2020-01-23 NOTE — PROGRESS NOTES
"Chief Complaint   Patient presents with   • Coronary Artery Disease       Subjective:   Isiah Resendez is a 50 y.o. male who presents today after cardiac catheterization.    He is followed by Dr. Godinez in our clinic, history of CAD with PCI x3 to dissected RCA 2/2018 at [facility], hypertension, methamphetamine/opioids abuse, and cardiomyopathy with ef 20%.    Patient underwent cardiac catheterization on 12/17/2019 w which showed widely patent stents in RCA and obstructive coronary artery disease involving the terminal portion of the circumflex/OM 5.  Moderate elevation in the LV EDP.  He was changed to Entresto.  Echocardiogram pending in February for evaluation for AICD.     CAD: tolerating medication. Denies any chest pain or SOB.    HFrEF: tolerating entresto, he stated \"I feel much better since the start of the entresto\". He is able now to climb two flights of stairs without stopping. The other day he stated he was able to stack firewoods, in which he has not been able to do for 2 years now.    Hypertension; stable    Meth abuse: last drug abuse has been a year now    Active tobacco abuse     Past Medical History:   Diagnosis Date   • Chronic pain    • Cold 1/1/15    head cold   • HTN (hypertension)    • Renal disorder     hx of kidney stones   • Urinary bladder disorder     bladder stone     Past Surgical History:   Procedure Laterality Date   • CYSTOSCOPY  1/13/2015    Performed by Eugenio Vargas M.D. at [facility]   • LASERTRIPSY  1/13/2015    Performed by Eugenio Vargas M.D. at [facility]   • LITHOTRIPSY  2009    kidney stones   • OTHER ORTHOPEDIC SURGERY  2001     R tibial plateu fracture   • OPEN REDUCTION     • OTHER      ORIF R elbow   • TONSILLECTOMY AND ADENOIDECTOMY       Family History   Problem Relation Age of Onset   • Other Mother         post polio   • Hypertension Mother    • Hyperlipidemia Mother    • Hyperlipidemia Father  "     Social History     Socioeconomic History   • Marital status: Single     Spouse name: Not on file   • Number of children: Not on file   • Years of education: Not on file   • Highest education level: Not on file   Occupational History   • Occupation:      Employer: [employer]   Social Needs   • Financial resource strain: Not on file   • Food insecurity:     Worry: Not on file     Inability: Not on file   • Transportation needs:     Medical: Not on file     Non-medical: Not on file   Tobacco Use   • Smoking status: Current Every Day Smoker     Packs/day: 1.00     Years: 25.00     Pack years: 25.00     Types: Cigarettes   • Smokeless tobacco: Never Used   Substance and Sexual Activity   • Alcohol use: Never     Frequency: Never   • Drug use: No     Types: Methamphetamines     Comment: quit 1 year ago   • Sexual activity: Yes     Partners: Female   Lifestyle   • Physical activity:     Days per week: Not on file     Minutes per session: Not on file   • Stress: Not on file   Relationships   • Social connections:     Talks on phone: Not on file     Gets together: Not on file     Attends Mandaeism service: Not on file     Active member of club or organization: Not on file     Attends meetings of clubs or organizations: Not on file     Relationship status: Not on file   • Intimate partner violence:     Fear of current or ex partner: Not on file     Emotionally abused: Not on file     Physically abused: Not on file     Forced sexual activity: Not on file   Other Topics Concern   • Not on file   Social History Narrative   • Not on file     No Known Allergies  Outpatient Encounter Medications as of 1/23/2020   Medication Sig Dispense Refill   • spironolactone (ALDACTONE) 25 MG Tab Take 1 Tab by mouth every day. 90 Tab 3   • carvedilol (COREG) 12.5 MG Tab Take 1 Tab by mouth 2 times a day, with meals. 180 Tab 3   • sacubitril-valsartan (ENTRESTO) 49-51 MG Tab tablet Take 1 Tab by mouth 2 Times a Day. 180  "Tab 3   • atorvastatin (LIPITOR) 40 MG Tab Take 1 Tab by mouth every evening. 30 Tab 2   • furosemide (LASIX) 40 MG Tab Take 1 Tab by mouth every day. 60 Tab 2   • aspirin (ASA) 81 MG Chew Tab chewable tablet Take 81 mg by mouth every day.     • [DISCONTINUED] carvedilol (COREG) 12.5 MG Tab Take 1 Tab by mouth 2 times a day, with meals. 60 Tab 2   • [DISCONTINUED] spironolactone (ALDACTONE) 25 MG Tab Take 25 mg by mouth every day.       No facility-administered encounter medications on file as of 1/23/2020.      Review of Systems   Constitutional: Negative for malaise/fatigue.   Eyes: Negative for blurred vision and double vision.   Respiratory: Negative for cough, shortness of breath and wheezing.    Cardiovascular: Negative for chest pain, palpitations, orthopnea and leg swelling.   Musculoskeletal: Negative for falls.   Neurological: Negative for dizziness, focal weakness, loss of consciousness, weakness and headaches.   All other systems reviewed and are negative.       Objective:   /80 (BP Location: Left arm, Patient Position: Sitting, BP Cuff Size: Adult)   Pulse 82   Resp 18   Ht 1.88 m (6' 2\")   Wt (!) 137.3 kg (302 lb 12.8 oz)   SpO2 94%   BMI 38.88 kg/m²     Physical Exam   Constitutional: He is oriented to person, place, and time. He appears well-developed and well-nourished. No distress.   HENT:   Head: Normocephalic and atraumatic.   Eyes: Pupils are equal, round, and reactive to light.   Neck: No JVD present.   Cardiovascular: Normal rate, regular rhythm and normal heart sounds.   Pulmonary/Chest: Effort normal and breath sounds normal.   Abdominal: Soft. Bowel sounds are normal. He exhibits no distension.   Musculoskeletal:         General: No edema.   Neurological: He is alert and oriented to person, place, and time.   Skin: Skin is warm and dry. He is not diaphoretic.   Psychiatric: He has a normal mood and affect. His behavior is normal. Judgment and thought content normal. "         Coronary angiogram  12/17/2019  Conclusions    1. Nonischemic cardiomyopathy.    2. Widely patent stents in the right coronary artery.    3. Obstructive coronary artery disease involving the terminal portion of the  circumflex/OM 5.    4. Moderate elevation in left ventricular end-diastolic pressure.    5. Severe reduction in LVEF.    6. Narrowed pulse pressure.     Post-Procedure Diagnosis    Nonischemic cardiomyopathy.     Recommendations    * Change lisinopril to Entresto.  Basic metabolic panel in 2 weeks time.  Follow-up with WENDIE in 2 to 3 weeks.  Echocardiogram in February to assess  candidacy for ICD.      ECHO  11/21/2019  Left Ventricle  Contrast was used to enhance visualization of the endocardial border.   Existing IV was used. 3 ML of contrast was administered. Left ventricle   is moderately dilated. Normal left ventricular wall thickness. Severely   reduced left ventricular systolic function. Left ventricular ejection   fraction is visually estimated to be 15-global hypokinesis. Grade II   diastolic dysfunction. 20%.     Right Ventricle  Normal right ventricular size. Reduced right ventricular systolic   function.     Right Atrium  Normal right atrial size. Normal inferior vena cava size and   inspiratory collapse.     Left Atrium  Normal left atrial size. Left atrial volume index is 28 mL/sq m.     Mitral Valve  Structurally normal mitral valve. No mitral stenosis. Mild mitral   regurgitation.     Aortic Valve  Structurally normal aortic valve. No aortic stenosis. Mild aortic   insufficiency.     Tricuspid Valve  Structurally normal tricuspid valve without significant stenosis or   regurgitation.  Unable to estimate pulmonary artery pressure due to an   inadequate tricuspid regurgitant jet.     Pulmonic Valve  Structurally normal pulmonic valve without significant stenosis or   regurgitation.     Pericardium  Normal pericardium without effusion.     Aorta  The aortic root is normal.  Ascending  aorta is dilated with a diameter   of  3.7 cm.    ECHO   5/2018    1. Normal left ventricular systolic function, with an ejection fraction of 59 %.   2. Upper normal LV wall thickness.   3. Mild aortic valve regurgitation.   4. The aortic root is mildly enlarged.   5. Compared to prior echocardiogram from 3/11/2018, left ventricular chamber size has decreased and left ventricular ejection fraction has increased significantly.   6. Sinus rhythm was present during the exam.    Lab Results   Component Value Date/Time    SODIUM 141 12/17/2019 08:45 AM    POTASSIUM 4.1 12/17/2019 08:45 AM    CHLORIDE 106 12/17/2019 08:45 AM    CO2 26 12/17/2019 08:45 AM    GLUCOSE 111 (H) 12/17/2019 08:45 AM    BUN 15 12/17/2019 08:45 AM    CREATININE 0.93 12/17/2019 08:45 AM    CREATININE 1.0 05/30/2006 08:57 AM        Assessment:     1. CAD S/P percutaneous coronary angioplasty     2. Ischemic cardiomyopathy     3. History of methamphetamine abuse (HCC)     4. History of narcotic addiction ([facility])         Medical Decision Making:  Today's Assessment / Status / Plan:       1. CAD;  - denies any chest pain or SOB  - cath site is clean, soft and good peripheral pulse on the right radial   -Continue aspirin 81 mg, atorvastatin 40 mg, and Coreg 12.5 mg twice daily    2.  Heart failure with reduced EF:  nonischemic cardiomyopathy, substance abuse  - Today, based on physical examination findings, patient is euvolemic. No JVD, lungs are clear to auscultation, no pitting edema in bilateral lower extremities, no ascites.   -Continue Coreg 12.5 mg twice daily, Lasix 40 mg daily, Entresto 49-51 mg twice daily, and Aldactone 25 mg daily  - Reinforced s/sx of worsening heart failure with patient and weight monitoring. Pt verbalizes understanding. Pt to call office or RTC if present.  - repeat ECHO in February    3. Tobacco abuse:  - discussed     Follow up in 2 months with Dr. Godinez, ECHO prior to appt.       Collaborating Provider: Dr. Thomas